# Patient Record
Sex: FEMALE | Race: WHITE | NOT HISPANIC OR LATINO | Employment: OTHER | URBAN - METROPOLITAN AREA
[De-identification: names, ages, dates, MRNs, and addresses within clinical notes are randomized per-mention and may not be internally consistent; named-entity substitution may affect disease eponyms.]

---

## 2020-02-08 NOTE — NURSING NOTE
ACC REVIEW REPORT: [facility]        PATIENT NAME: Sheila Gipson    PATIENT ID: [id number]    BED:  S474 / 4GH    BED TYPE:  TELEMETRY    BED GIVEN TO:   SANGEETA SAMUEL RN    TIME BED GIVEN:  18:42    YOB: 1950    AGE:  70    GENDER:  FEMALE    PREVIOUS ADMIT TO [facility]:  NO    PREVIOUS ADMISSION DATE:  N/A    PATIENT CLASS:  OUTPATIENT    TODAY'S DATE: 2/8/2020    TRANSFER DATE:  02/08/2020    ETA:   22:30 - 23:00    TRANSFERRING FACILITY:   [facility]    TRANSFERRING FACILITY PHONE # :  [phone], this is the number for the ED, ask them to transfer you to the patient's nurses station.  The main phone number is not working on 02/08/2020.     TRANSFERRING MD:  DR SORAYA CANCINO    DATE/TIME REQUEST RECEIVED:  02/08/2020 @ 17:18    [facility] RN:  FAVIO ESPINOZA RN    REPORT FROM:  SANGEETA SAMUEL RN    TIME REPORT TAKEN:  18:42    DIAGNOSIS:  RESPIRATORY FAILURE, COPD, PUNEMONIA    REASON FOR TRANSFER TO [facility]:   The patient has a daughter that lives in [place] and has requested to transfer                                                                   to be closer to her family.     TRANSPORTATION:  [employer], company has to be able to transport patient on BI PAP.    CLINICAL REASON FOR TRANSFER TO [facility]:  The patient was admitted to the [facility] on 02/05 with Respiratory Failure and Flu A.  She has improved but is not ready for discharge to home due to her respiratory status.  Her daughter lives in [place] and she has requested to transfer to [facility] in order to be closer to family.   On transfer she is on a continuous BI PAP 15/8 35% FIO2.  She has been off BI PAP only a short intervals in order to have sips of liquids.  When off her BI PAP she is on a 100% NON RE-BREATHER.  She has a stage II decutibus on the bridge of her nose, mepilex is in place on her nose to protect the skin from further breakdown.  On BI PAP she has maintained her O2 Sats at 97-99%.      CLINICAL  INFORMATION    HEIGHT:  5' 6''    WEIGHT:  123 lbs    ALLERGIES:  NKDA    COLES:  NONE    INFECTIOUS DISEASE:  NONE    ISOLATION:  NONE    1ST VITAL SIGNS:   TIME:  16:00  TEMP:  97.4  PULSE: 59   B/P: 135/68    RESP: 17, O2 Sat 100% on BI PAP,  Settings are 15/8  35%.    LAB INFORMATION:  02/08/2020   WBC 11.2,  H/H  14.5 / 44.4,  ,  ,  K+ 3.9, Chloride 107, CO2  29,                                                              BUN 37,  CREATININE 0.83, GLUCOSE 103,  BNP 1408.     MEDS/IV FLUIDS:  IV ACCESS PER A # 22 ga in the LEFT F/A.  Patient remains on IV Solumedrol and every                                   48 hours IV Levaquin.        CARDIAC SYSTEM:    CHEST PAIN:   NONE    RHYTHM:  SINUS RHYTHM    Is patient taking or has patient been given any drugs that could increase bleeding?    NO  (Plavix, Brilinta, Effient, Eliquis, Xarelto, Warfarin, Integrilin, Angiomax)    CARDIAC NOTES:   Pt has hx of HTN, TOBACCO ABUSE, COPD, and EMPHYSEMA.       RESPIRATORY SYSTEM:    LUNG SOUNDS:    DIMINISHED:  YES    ABG DATE:  02/08/2020        ABG TIME:  11:50    ABG RESULTS:    PH:  7.450  PO2:  147  PCO2:  42  HCO3:  29.2   O2 SAT:  99%  BASE EXCESS:  +5,   ON BI PAP    OXYGEN:  YES    O2 SAT:  99%    ADMINISTRATION ROUTE:  BI PAP   Settings: 15/8  FIO2 35%   The patient CANNOT tolerate any time off her BI PAP.                                                       In order for her to take a drink of liquid the staff have placed her on 100% NON                                                                      RE-BREATHER.  Her head of the bed is always elevated.       RADIOLOGY RESULTS:   A COPY OF HER CXR WILL BE SENT WITH HER.     RESPIRATORY STATUS:  THE PATIENT'S BREATH SOUNDS ARE TIGHT TO AUSCULTATION.  SHE DOES HAVE A PRODUCTIVE COUGH OF WHITE SPUTUM.  SHE HAS NOT REQUIRED SUCTIONING.       CNS/MUSCULOSKELETAL    ALERT AND ORIENTED:    PERSON:  YES  PLACE:  YES  TIME:  YES    INJURY:    YES  WHERE:    THERE IS A STAGE II PRESSURE ULCER ON THE BRIDGE OF HER NOSE FROM HER BI PAP.  MEPILEX                   HAS BEEN PLACED ON HER NOSE TO PAD THE AREA WHERE THE BI PAP SITS.     KIARA COMA SCALE:    E:  4  M: 6   V:  5    EXTREMITY WEAKNESS:    LEFT ARM:  NO, her IV is a 22 gauge placed in her Left FA.  RIGHT ARM:  NO  LEFT LEG:  NO  RIGHT LEG:  NO    CNS/MUSCULOSKELETAL NOTES:  The patient is alert and oriented. Her daughters are present at bedside.                                                                     Mrs Harrington is up with assist of one.  Mrs Harrington is able to converse and has been                                                                   cooperative with her care.       GI//GY      ABDOMINAL PAIN:  NO    VOMITING:  NO    DIARRHEA:  NO    NAUSEA:   NO    BOWEL SOUNDS:  HYPOACTIVE    OCCULT STOOL:  NONE    VAGINAL BLEEDING:  NO     HEMATURIA:   NO    GI//GY NOTES:   THE PATIENT IS ON A DIABETIC DIET, (SHE HAS BEEN ON SOLUMEDROL IV WITH ACCUCHECKS AC AND HS).  TODAY AT 16:00 HER FINGER STICK BLOOD SUGAR , she received 4 units  OF  HUMALOG INSULIN.     INTAKE:  The patient has taken no solid food, only sips of apple juice and Boost.      MEDICATIONS:  MRS. HARRINGTON TAKES HER PILLS WHOLE.      PAST MEDICAL HISTORY:  TOBACCO ABUSE, former smoker, COPD, HTN, PERIPHERAL NEUROPATHY,                                                    ARTHRITIS, and EMPHYSEMA.     ADDITIONAL NOTES:   DAUGHTER:  GERARD GARY,   [phone]                                              SISTER:   NIKKIE CAMPA,   [phone]          Gris Kaba, RN  2/8/2020  6:40 PM

## 2020-02-09 ENCOUNTER — HOSPITAL ENCOUNTER (INPATIENT)
Facility: HOSPITAL | Age: 70
LOS: 8 days | Discharge: NURSING, REHAB OR LONG-TERM CARE | End: 2020-02-17
Attending: INTERNAL MEDICINE | Admitting: FAMILY MEDICINE

## 2020-02-09 ENCOUNTER — APPOINTMENT (OUTPATIENT)
Dept: GENERAL RADIOLOGY | Facility: HOSPITAL | Age: 70
End: 2020-02-09

## 2020-02-09 DIAGNOSIS — Z74.09 IMPAIRED MOBILITY AND ADLS: Primary | ICD-10-CM

## 2020-02-09 DIAGNOSIS — Z78.9 IMPAIRED MOBILITY AND ADLS: Primary | ICD-10-CM

## 2020-02-09 DIAGNOSIS — J96.01 ACUTE RESPIRATORY FAILURE WITH HYPOXIA (HCC): ICD-10-CM

## 2020-02-09 PROBLEM — I10 ESSENTIAL HYPERTENSION: Status: ACTIVE | Noted: 2020-02-09

## 2020-02-09 PROBLEM — R63.4 UNINTENTIONAL WEIGHT LOSS: Status: ACTIVE | Noted: 2020-02-09

## 2020-02-09 PROBLEM — Z72.0 TOBACCO USE: Status: ACTIVE | Noted: 2020-02-09

## 2020-02-09 PROBLEM — I16.0 HYPERTENSIVE URGENCY: Status: ACTIVE | Noted: 2020-02-09

## 2020-02-09 PROBLEM — R53.81 DEBILITY: Status: ACTIVE | Noted: 2020-02-09

## 2020-02-09 PROBLEM — J11.1 INFLUENZA: Status: ACTIVE | Noted: 2020-02-09

## 2020-02-09 PROBLEM — G25.0 BENIGN ESSENTIAL TREMOR: Status: ACTIVE | Noted: 2020-02-09

## 2020-02-09 PROBLEM — J44.1 COPD WITH ACUTE EXACERBATION (HCC): Status: ACTIVE | Noted: 2020-02-09

## 2020-02-09 LAB
ALBUMIN SERPL-MCNC: 4.1 G/DL (ref 3.5–5.2)
ALBUMIN/GLOB SERPL: 1.3 G/DL
ALP SERPL-CCNC: 33 U/L (ref 39–117)
ALT SERPL W P-5'-P-CCNC: 29 U/L (ref 1–33)
ANION GAP SERPL CALCULATED.3IONS-SCNC: 12 MMOL/L (ref 5–15)
ARTERIAL PATENCY WRIST A: ABNORMAL
AST SERPL-CCNC: 29 U/L (ref 1–32)
ATMOSPHERIC PRESS: ABNORMAL MM[HG]
BASE EXCESS BLDA CALC-SCNC: 7.5 MMOL/L (ref 0–2)
BDY SITE: ABNORMAL
BILIRUB SERPL-MCNC: 0.7 MG/DL (ref 0.2–1.2)
BODY TEMPERATURE: 37 C
BUN BLD-MCNC: 44 MG/DL (ref 8–23)
BUN/CREAT SERPL: 51.2 (ref 7–25)
CALCIUM SPEC-SCNC: 9.1 MG/DL (ref 8.6–10.5)
CHLORIDE SERPL-SCNC: 100 MMOL/L (ref 98–107)
CO2 BLDA-SCNC: 34.5 MMOL/L (ref 22–33)
CO2 SERPL-SCNC: 32 MMOL/L (ref 22–29)
COHGB MFR BLD: 0.3 % (ref 0–2)
CREAT BLD-MCNC: 0.86 MG/DL (ref 0.57–1)
D-LACTATE SERPL-SCNC: 1.3 MMOL/L (ref 0.5–2)
DEPRECATED RDW RBC AUTO: 46 FL (ref 37–54)
EPAP: 6
ERYTHROCYTE [DISTWIDTH] IN BLOOD BY AUTOMATED COUNT: 12.8 % (ref 12.3–15.4)
GFR SERPL CREATININE-BSD FRML MDRD: 65 ML/MIN/1.73
GLOBULIN UR ELPH-MCNC: 3.2 GM/DL
GLUCOSE BLD-MCNC: 116 MG/DL (ref 65–99)
HBA1C MFR BLD: 5.9 % (ref 4.8–5.6)
HCO3 BLDA-SCNC: 33.1 MMOL/L (ref 20–26)
HCT VFR BLD AUTO: 47.9 % (ref 34–46.6)
HCT VFR BLD CALC: 48.2 %
HGB BLD-MCNC: 15.5 G/DL (ref 12–15.9)
HGB BLDA-MCNC: 15.7 G/DL (ref 14–18)
HOROWITZ INDEX BLD+IHG-RTO: 40 %
INR PPP: 1.06 (ref 0.85–1.16)
IPAP: 16
MAGNESIUM SERPL-MCNC: 2.7 MG/DL (ref 1.6–2.4)
MCH RBC QN AUTO: 31.4 PG (ref 26.6–33)
MCHC RBC AUTO-ENTMCNC: 32.4 G/DL (ref 31.5–35.7)
MCV RBC AUTO: 97.2 FL (ref 79–97)
METHGB BLD QL: 0.8 % (ref 0–1.5)
MODALITY: ABNORMAL
NOTE: ABNORMAL
NT-PROBNP SERPL-MCNC: 1363 PG/ML (ref 5–900)
OXYHGB MFR BLDV: 97.1 % (ref 94–99)
PCO2 BLDA: 47.9 MM HG (ref 35–45)
PCO2 TEMP ADJ BLD: 47.9 MM HG (ref 35–45)
PH BLDA: 7.45 PH UNITS (ref 7.35–7.45)
PH, TEMP CORRECTED: 7.45 PH UNITS
PLATELET # BLD AUTO: 183 10*3/MM3 (ref 140–450)
PMV BLD AUTO: 10.4 FL (ref 6–12)
PO2 BLDA: 108 MM HG (ref 83–108)
PO2 TEMP ADJ BLD: 108 MM HG (ref 83–108)
POTASSIUM BLD-SCNC: 4.3 MMOL/L (ref 3.5–5.2)
PROT SERPL-MCNC: 7.3 G/DL (ref 6–8.5)
PROTHROMBIN TIME: 13.3 SECONDS (ref 11.2–14.3)
RBC # BLD AUTO: 4.93 10*6/MM3 (ref 3.77–5.28)
SET MECH RESP RATE: 10
SODIUM BLD-SCNC: 144 MMOL/L (ref 136–145)
TSH SERPL DL<=0.05 MIU/L-ACNC: 0.79 UIU/ML (ref 0.27–4.2)
VENTILATOR MODE: ABNORMAL
WBC NRBC COR # BLD: 12.41 10*3/MM3 (ref 3.4–10.8)

## 2020-02-09 PROCEDURE — 71045 X-RAY EXAM CHEST 1 VIEW: CPT

## 2020-02-09 PROCEDURE — 25010000002 HEPARIN (PORCINE) PER 1000 UNITS: Performed by: INTERNAL MEDICINE

## 2020-02-09 PROCEDURE — 83036 HEMOGLOBIN GLYCOSYLATED A1C: CPT | Performed by: INTERNAL MEDICINE

## 2020-02-09 PROCEDURE — 85027 COMPLETE CBC AUTOMATED: CPT | Performed by: INTERNAL MEDICINE

## 2020-02-09 PROCEDURE — 36600 WITHDRAWAL OF ARTERIAL BLOOD: CPT

## 2020-02-09 PROCEDURE — 84443 ASSAY THYROID STIM HORMONE: CPT | Performed by: INTERNAL MEDICINE

## 2020-02-09 PROCEDURE — 94660 CPAP INITIATION&MGMT: CPT

## 2020-02-09 PROCEDURE — 63710000001 PREDNISONE PER 1 MG: Performed by: INTERNAL MEDICINE

## 2020-02-09 PROCEDURE — 94799 UNLISTED PULMONARY SVC/PX: CPT

## 2020-02-09 PROCEDURE — 99223 1ST HOSP IP/OBS HIGH 75: CPT | Performed by: INTERNAL MEDICINE

## 2020-02-09 PROCEDURE — 83605 ASSAY OF LACTIC ACID: CPT | Performed by: INTERNAL MEDICINE

## 2020-02-09 PROCEDURE — 82805 BLOOD GASES W/O2 SATURATION: CPT

## 2020-02-09 PROCEDURE — 80053 COMPREHEN METABOLIC PANEL: CPT | Performed by: INTERNAL MEDICINE

## 2020-02-09 PROCEDURE — 85610 PROTHROMBIN TIME: CPT | Performed by: INTERNAL MEDICINE

## 2020-02-09 PROCEDURE — 25010000002 CEFTRIAXONE PER 250 MG: Performed by: INTERNAL MEDICINE

## 2020-02-09 PROCEDURE — 87040 BLOOD CULTURE FOR BACTERIA: CPT | Performed by: INTERNAL MEDICINE

## 2020-02-09 PROCEDURE — 94640 AIRWAY INHALATION TREATMENT: CPT

## 2020-02-09 PROCEDURE — 83735 ASSAY OF MAGNESIUM: CPT | Performed by: INTERNAL MEDICINE

## 2020-02-09 PROCEDURE — 83880 ASSAY OF NATRIURETIC PEPTIDE: CPT | Performed by: INTERNAL MEDICINE

## 2020-02-09 RX ORDER — ACETAMINOPHEN 325 MG/1
650 TABLET ORAL EVERY 4 HOURS PRN
Status: DISCONTINUED | OUTPATIENT
Start: 2020-02-09 | End: 2020-02-17 | Stop reason: HOSPADM

## 2020-02-09 RX ORDER — BUDESONIDE 0.5 MG/2ML
0.5 INHALANT ORAL
Status: DISCONTINUED | OUTPATIENT
Start: 2020-02-09 | End: 2020-02-16

## 2020-02-09 RX ORDER — PROPRANOLOL HYDROCHLORIDE 10 MG/1
10 TABLET ORAL 2 TIMES DAILY
Status: DISCONTINUED | OUTPATIENT
Start: 2020-02-09 | End: 2020-02-17 | Stop reason: HOSPADM

## 2020-02-09 RX ORDER — ARFORMOTEROL TARTRATE 15 UG/2ML
15 SOLUTION RESPIRATORY (INHALATION)
Status: DISCONTINUED | OUTPATIENT
Start: 2020-02-09 | End: 2020-02-16

## 2020-02-09 RX ORDER — IPRATROPIUM BROMIDE AND ALBUTEROL SULFATE 2.5; .5 MG/3ML; MG/3ML
3 SOLUTION RESPIRATORY (INHALATION)
Status: DISCONTINUED | OUTPATIENT
Start: 2020-02-09 | End: 2020-02-16

## 2020-02-09 RX ORDER — DOCUSATE SODIUM 100 MG/1
100 CAPSULE, LIQUID FILLED ORAL 2 TIMES DAILY PRN
Status: DISCONTINUED | OUTPATIENT
Start: 2020-02-09 | End: 2020-02-17 | Stop reason: HOSPADM

## 2020-02-09 RX ORDER — AZITHROMYCIN 250 MG/1
250 TABLET, FILM COATED ORAL DAILY
Status: COMPLETED | OUTPATIENT
Start: 2020-02-10 | End: 2020-02-13

## 2020-02-09 RX ORDER — NICOTINE 21 MG/24HR
1 PATCH, TRANSDERMAL 24 HOURS TRANSDERMAL
Status: DISCONTINUED | OUTPATIENT
Start: 2020-02-09 | End: 2020-02-09

## 2020-02-09 RX ORDER — METHYLPREDNISOLONE SODIUM SUCCINATE 40 MG/ML
20 INJECTION, POWDER, LYOPHILIZED, FOR SOLUTION INTRAMUSCULAR; INTRAVENOUS EVERY 8 HOURS
Status: DISCONTINUED | OUTPATIENT
Start: 2020-02-09 | End: 2020-02-09

## 2020-02-09 RX ORDER — HEPARIN SODIUM 5000 [USP'U]/ML
5000 INJECTION, SOLUTION INTRAVENOUS; SUBCUTANEOUS EVERY 8 HOURS SCHEDULED
Status: DISCONTINUED | OUTPATIENT
Start: 2020-02-09 | End: 2020-02-17 | Stop reason: HOSPADM

## 2020-02-09 RX ORDER — FAMOTIDINE 20 MG/1
20 TABLET, FILM COATED ORAL 2 TIMES DAILY PRN
Status: DISCONTINUED | OUTPATIENT
Start: 2020-02-09 | End: 2020-02-17 | Stop reason: HOSPADM

## 2020-02-09 RX ORDER — CHOLECALCIFEROL (VITAMIN D3) 125 MCG
5 CAPSULE ORAL NIGHTLY PRN
Status: DISCONTINUED | OUTPATIENT
Start: 2020-02-09 | End: 2020-02-17 | Stop reason: HOSPADM

## 2020-02-09 RX ORDER — ACETAMINOPHEN 650 MG/1
650 SUPPOSITORY RECTAL EVERY 4 HOURS PRN
Status: DISCONTINUED | OUTPATIENT
Start: 2020-02-09 | End: 2020-02-17 | Stop reason: HOSPADM

## 2020-02-09 RX ORDER — AZITHROMYCIN 250 MG/1
500 TABLET, FILM COATED ORAL DAILY
Status: COMPLETED | OUTPATIENT
Start: 2020-02-09 | End: 2020-02-09

## 2020-02-09 RX ORDER — PREDNISONE 20 MG/1
40 TABLET ORAL
Status: DISCONTINUED | OUTPATIENT
Start: 2020-02-09 | End: 2020-02-10

## 2020-02-09 RX ORDER — SODIUM CHLORIDE 0.9 % (FLUSH) 0.9 %
10 SYRINGE (ML) INJECTION AS NEEDED
Status: DISCONTINUED | OUTPATIENT
Start: 2020-02-09 | End: 2020-02-17 | Stop reason: HOSPADM

## 2020-02-09 RX ORDER — ONDANSETRON 2 MG/ML
4 INJECTION INTRAMUSCULAR; INTRAVENOUS EVERY 6 HOURS PRN
Status: DISCONTINUED | OUTPATIENT
Start: 2020-02-09 | End: 2020-02-17 | Stop reason: HOSPADM

## 2020-02-09 RX ORDER — NICOTINE 21 MG/24HR
1 PATCH, TRANSDERMAL 24 HOURS TRANSDERMAL
Status: DISCONTINUED | OUTPATIENT
Start: 2020-02-09 | End: 2020-02-17 | Stop reason: HOSPADM

## 2020-02-09 RX ORDER — ACETAMINOPHEN 160 MG/5ML
650 SOLUTION ORAL EVERY 4 HOURS PRN
Status: DISCONTINUED | OUTPATIENT
Start: 2020-02-09 | End: 2020-02-17 | Stop reason: HOSPADM

## 2020-02-09 RX ORDER — LABETALOL HYDROCHLORIDE 5 MG/ML
10 INJECTION, SOLUTION INTRAVENOUS
Status: DISCONTINUED | OUTPATIENT
Start: 2020-02-09 | End: 2020-02-17 | Stop reason: HOSPADM

## 2020-02-09 RX ORDER — ONDANSETRON 4 MG/1
4 TABLET, FILM COATED ORAL EVERY 6 HOURS PRN
Status: DISCONTINUED | OUTPATIENT
Start: 2020-02-09 | End: 2020-02-17 | Stop reason: HOSPADM

## 2020-02-09 RX ORDER — SODIUM CHLORIDE 0.9 % (FLUSH) 0.9 %
10 SYRINGE (ML) INJECTION EVERY 12 HOURS SCHEDULED
Status: DISCONTINUED | OUTPATIENT
Start: 2020-02-09 | End: 2020-02-17 | Stop reason: HOSPADM

## 2020-02-09 RX ADMIN — CEFTRIAXONE 1 G: 1 INJECTION, POWDER, FOR SOLUTION INTRAMUSCULAR; INTRAVENOUS at 18:53

## 2020-02-09 RX ADMIN — AZITHROMYCIN MONOHYDRATE 500 MG: 250 TABLET ORAL at 18:52

## 2020-02-09 RX ADMIN — NICOTINE 1 PATCH: 14 PATCH, EXTENDED RELEASE TRANSDERMAL at 18:54

## 2020-02-09 RX ADMIN — BUDESONIDE 0.5 MG: 0.5 INHALANT RESPIRATORY (INHALATION) at 23:59

## 2020-02-09 RX ADMIN — HEPARIN SODIUM 5000 UNITS: 5000 INJECTION, SOLUTION INTRAVENOUS; SUBCUTANEOUS at 20:39

## 2020-02-09 RX ADMIN — HEPARIN SODIUM 5000 UNITS: 5000 INJECTION, SOLUTION INTRAVENOUS; SUBCUTANEOUS at 18:53

## 2020-02-09 RX ADMIN — SODIUM CHLORIDE, PRESERVATIVE FREE 10 ML: 5 INJECTION INTRAVENOUS at 20:41

## 2020-02-09 RX ADMIN — PROPRANOLOL HYDROCHLORIDE 10 MG: 10 TABLET ORAL at 20:40

## 2020-02-09 RX ADMIN — PREDNISONE 40 MG: 20 TABLET ORAL at 19:52

## 2020-02-09 RX ADMIN — IPRATROPIUM BROMIDE AND ALBUTEROL SULFATE 3 ML: 2.5; .5 SOLUTION RESPIRATORY (INHALATION) at 23:59

## 2020-02-09 RX ADMIN — ARFORMOTEROL TARTRATE 15 MCG: 15 SOLUTION RESPIRATORY (INHALATION) at 23:59

## 2020-02-09 RX ADMIN — LABETALOL 20 MG/4 ML (5 MG/ML) INTRAVENOUS SYRINGE 10 MG: at 15:37

## 2020-02-09 RX ADMIN — IPRATROPIUM BROMIDE AND ALBUTEROL SULFATE 3 ML: 2.5; .5 SOLUTION RESPIRATORY (INHALATION) at 15:42

## 2020-02-09 NOTE — H&P
[facility] Medicine Services  HISTORY AND PHYSICAL    Patient Name: Sheila Gipson  : 1950  MRN: [id number]  Primary Care Physician: Provider, No Known  Date of admission: 2020      Subjective   Subjective     Chief Complaint:  Transfer for flu, respiratory failure    HPI:  Sheila Gipson is a 69 y.o. female with past medical history of chronic respiratory failure, COPD, essential tremors, and hypertension who initially developed severe progressive shortness of breath on  associated with productive yellow sputum which was worsened with exertion and improved with rest and breathing treatments.  She had associated fevers and chills and flulike symptoms.  She presented to [facility] on  and was diagnosed with influenza and placed on BiPAP.  She reportedly has been receiving Tamiflu and BiPAP therapy along with steroids and nebulizers since that time.  Her daughter who lives in [place] requested she be transferred to [facility] [place] by [facility] for a higher level of care and to be closer to her family.  She reportedly has transitioned off BiPAP on a 100% nonrebreather just long enough to eat an occasional bite.  She except for a little juice she has not had any significant calorie intake for several days now.  She is developed a decubitus ulcer on the bridge of her nose and a Mepilex is currently on the bridge of her nose to prevent further skin breakdown.  She arrived at our hospital on a BiPAP with 35% oxygen.  She says her shortness of breath has improved.  She denies any confusion.  Her daughter is at the bedside.  She says her fevers and chills have improved.  She notes severe generalized weakness.  She also complains of a 15 pound weight loss over the last 2 months due to poor appetite.  She is a relatively poor historian.  Her daughter has assisted with some history.    Review of Systems   Constitutional: Positive for chills, fatigue and fever.   HENT: Negative for sinus pressure and  sinus pain.    Eyes: Negative.    Respiratory: Positive for cough, shortness of breath and wheezing.    Cardiovascular: Negative for chest pain and palpitations.   Gastrointestinal: Negative for diarrhea, nausea and vomiting.   Endocrine: Negative.    Genitourinary: Negative for dysuria and hematuria.   Musculoskeletal: Negative for neck pain and neck stiffness.   Skin: Negative for rash and wound.   Allergic/Immunologic: Negative.    Neurological: Negative for light-headedness and headaches.   Hematological: Negative for adenopathy. Does not bruise/bleed easily.   Psychiatric/Behavioral: Negative for confusion. The patient is not nervous/anxious.           Personal History     Past medical history reported: Tobacco use, COPD/emphysema, essential hypertension, and essential tremor    Denies previous surgeries    Family History:  pertinent FHx was reviewed and unremarkable.     Social History:    Patient currently smokes cigarettes but wants to quit.  Denies any alcohol use    Medications:  Available home medication information reviewed.  Reports home medicine currently propranolol 10 mg twice daily and albuterol inhaler    No known allergies    Objective   Objective     Vital Signs:   Heart Rate:  [60-63] 63  BP: (190)/(109) 190/109       Blood pressure 190/109  95% oxygen on BiPAP  Respirations 22    Physical Exam   Constitutional: Awake, alert  Eyes: PERRLA, sclerae anicteric, no conjunctival injection  HENT: nose bridge decub, NCAT, mucous membranes moist  Neck: Supple, no thyromegaly, no lymphadenopathy, trachea midline  Respiratory: Diffuse wheezing and rhonchi bilaterally, occasional productive cough, labored breathing with some accessory muscle use, tachypnea, currently on BiPAP at 35%o2  Cardiovascular: RRR, palpable radial pulses bilaterally  Gastrointestinal: Positive bowel sounds, soft, nontender, nondistended  Musculoskeletal: No bilateral ankle edema, no clubbing or cyanosis to extremities  Psychiatric:  Appropriate affect, cooperative  Neurologic: No slurred speech or facial droop,  oriented  Skin: No rashes or jaundice      Results Reviewed:  Chest x-ray ordered and pending    Only outside hospital records sent was a medication list of hospitals medications being given at [facility].  They include Solu-Medrol, Tamiflu, Levaquin, duo nebs, nicotine patch, propranolol    No other progress notes, labs, or radiology results available              Invalid input(s):  ALKPHOS  CrCl cannot be calculated (No successful lab value found.).  Brief Urine Lab Results     None        Imaging Results (Last 24 Hours)     Procedure Component Value Units Date/Time    XR Chest 1 View [[id number]] Resulted:  02/09/20 1538     Updated:  02/09/20 1538             Assessment/Plan   Assessment & Plan     Active Hospital Problems    Diagnosis POA   • **Acute respiratory failure with hypoxia (CMS/HCC) [J96.01] Yes   • Tobacco use [Z72.0] Yes   • COPD with acute exacerbation (CMS/HCC) [J44.1] Yes   • Influenza [J11.1] Yes   • Benign essential tremor [G25.0] Yes   • Unintentional weight loss [R63.4] Yes   • Essential hypertension [I10] Yes   • Hypertensive urgency [I16.0] Yes     69-year-old female admitted at [facility] on 2/5 with influenza, acute on chronic hypoxic respiratory failure requiring BiPAP and was transferred to [facility] for further medical management.    Acute on chronic hypoxic respiratory failure:  Due to influenza and COPD exacerbation.  Plan to try and wean off BiPAP today.  Check ABG.  Treat underlying issues.  Check chest x-ray.  No imaging studies from outside facility currently available.    Acute influenza:  Awaiting outside records to confirm diagnosis.  Continue Tamiflu.  Supportive care.  Possible concomitant bacterial bronchitis or pneumonia.  Also plan for azithromycin and ceftriaxone.  Stop Levaquin due to high side effect profile.    COPD exacerbation   Duo nebs, nebulized Pulmicort, nebulized Brovana, prednisone.   Wean oxygen as tolerated.    Unintentional weight loss: Concern for malnutrition.  Consult dietitian to assess.  Supplements.    Accelerated hypertension with underlying essential hypertension:  Continue home propranolol.  Labetalol as needed for blood pressure greater than 180.  Monitor and adjust as needed.    Essential tremor: Continue propranolol.  Monitor.    Tobacco use: Cessation counseled.  Nicotine patch.    Outside records are available reviewed.  Currently only and list of medications.  Request has been placed for full outside records.  Awaiting laboratory studies.  Follow-up on chest x-ray.  Follow-up on ABG.  Case discussed with respiratory therapy who is planning to try and wean her off of BiPAP.  PT OT ordered for debility.    DVT prophylaxis:  KENIA    CODE STATUS:    Code Status and Medical Interventions:   Ordered at: 02/09/20 1526     Code Status:    No CPR     Medical Interventions (Level of Support Prior to Arrest):    Full     Comments:    ok with intubation no CPR   CODE STATUS discussed with patient and daughter at the bedside.  Patient says she would not want CPR however if she needed 1 to 2 days on a ventilator she says she probably would not want this.  She will continue to think and discuss with her family regarding her ventilator wishes.  She reaffirmed wishes for no CPR with myself and the nurse present.    Admission Status:  I believe this patient meets INPATIENT status due to respiratory failure and influenza.  I feel patient’s risk for adverse outcomes and need for care warrant INPATIENT evaluation and I predict the patient’s care encounter to likely last beyond 2 midnights.      Electronically signed by Chato Felder MD, 02/09/20, 3:28 PM.

## 2020-02-10 ENCOUNTER — APPOINTMENT (OUTPATIENT)
Dept: GENERAL RADIOLOGY | Facility: HOSPITAL | Age: 70
End: 2020-02-10

## 2020-02-10 ENCOUNTER — APPOINTMENT (OUTPATIENT)
Dept: CT IMAGING | Facility: HOSPITAL | Age: 70
End: 2020-02-10

## 2020-02-10 LAB
ANION GAP SERPL CALCULATED.3IONS-SCNC: 12 MMOL/L (ref 5–15)
BUN BLD-MCNC: 42 MG/DL (ref 8–23)
BUN/CREAT SERPL: 56.8 (ref 7–25)
CALCIUM SPEC-SCNC: 8.7 MG/DL (ref 8.6–10.5)
CHLORIDE SERPL-SCNC: 98 MMOL/L (ref 98–107)
CO2 SERPL-SCNC: 30 MMOL/L (ref 22–29)
CREAT BLD-MCNC: 0.74 MG/DL (ref 0.57–1)
DEPRECATED RDW RBC AUTO: 46.2 FL (ref 37–54)
ERYTHROCYTE [DISTWIDTH] IN BLOOD BY AUTOMATED COUNT: 12.8 % (ref 12.3–15.4)
GFR SERPL CREATININE-BSD FRML MDRD: 78 ML/MIN/1.73
GLUCOSE BLD-MCNC: 110 MG/DL (ref 65–99)
HCT VFR BLD AUTO: 47.7 % (ref 34–46.6)
HGB BLD-MCNC: 15.4 G/DL (ref 12–15.9)
MAGNESIUM SERPL-MCNC: 2.7 MG/DL (ref 1.6–2.4)
MCH RBC QN AUTO: 31.6 PG (ref 26.6–33)
MCHC RBC AUTO-ENTMCNC: 32.3 G/DL (ref 31.5–35.7)
MCV RBC AUTO: 97.9 FL (ref 79–97)
PLATELET # BLD AUTO: 169 10*3/MM3 (ref 140–450)
PMV BLD AUTO: 11 FL (ref 6–12)
POTASSIUM BLD-SCNC: 4.1 MMOL/L (ref 3.5–5.2)
RBC # BLD AUTO: 4.87 10*6/MM3 (ref 3.77–5.28)
SODIUM BLD-SCNC: 140 MMOL/L (ref 136–145)
WBC NRBC COR # BLD: 13.71 10*3/MM3 (ref 3.4–10.8)

## 2020-02-10 PROCEDURE — 83735 ASSAY OF MAGNESIUM: CPT | Performed by: INTERNAL MEDICINE

## 2020-02-10 PROCEDURE — 25010000002 HEPARIN (PORCINE) PER 1000 UNITS: Performed by: INTERNAL MEDICINE

## 2020-02-10 PROCEDURE — 94799 UNLISTED PULMONARY SVC/PX: CPT

## 2020-02-10 PROCEDURE — 25010000002 METHYLPREDNISOLONE PER 125 MG: Performed by: PHYSICIAN ASSISTANT

## 2020-02-10 PROCEDURE — 99233 SBSQ HOSP IP/OBS HIGH 50: CPT | Performed by: HOSPITALIST

## 2020-02-10 PROCEDURE — 0 IOPAMIDOL PER 1 ML: Performed by: HOSPITALIST

## 2020-02-10 PROCEDURE — 80048 BASIC METABOLIC PNL TOTAL CA: CPT | Performed by: INTERNAL MEDICINE

## 2020-02-10 PROCEDURE — 25010000002 CEFTRIAXONE PER 250 MG: Performed by: INTERNAL MEDICINE

## 2020-02-10 PROCEDURE — 85027 COMPLETE CBC AUTOMATED: CPT | Performed by: INTERNAL MEDICINE

## 2020-02-10 PROCEDURE — 71045 X-RAY EXAM CHEST 1 VIEW: CPT

## 2020-02-10 PROCEDURE — 71275 CT ANGIOGRAPHY CHEST: CPT

## 2020-02-10 RX ORDER — METHYLPREDNISOLONE SODIUM SUCCINATE 125 MG/2ML
80 INJECTION, POWDER, LYOPHILIZED, FOR SOLUTION INTRAMUSCULAR; INTRAVENOUS EVERY 12 HOURS SCHEDULED
Status: DISCONTINUED | OUTPATIENT
Start: 2020-02-10 | End: 2020-02-12

## 2020-02-10 RX ORDER — IPRATROPIUM BROMIDE AND ALBUTEROL SULFATE 2.5; .5 MG/3ML; MG/3ML
6 SOLUTION RESPIRATORY (INHALATION) ONCE
Status: COMPLETED | OUTPATIENT
Start: 2020-02-10 | End: 2020-02-10

## 2020-02-10 RX ORDER — SODIUM CHLORIDE 450 MG/100ML
125 INJECTION, SOLUTION INTRAVENOUS CONTINUOUS
Status: DISCONTINUED | OUTPATIENT
Start: 2020-02-10 | End: 2020-02-14

## 2020-02-10 RX ORDER — OSELTAMIVIR PHOSPHATE 75 MG/1
75 CAPSULE ORAL EVERY 12 HOURS SCHEDULED
Status: DISCONTINUED | OUTPATIENT
Start: 2020-02-10 | End: 2020-02-10

## 2020-02-10 RX ORDER — OSELTAMIVIR PHOSPHATE 30 MG/1
30 CAPSULE ORAL EVERY 12 HOURS SCHEDULED
Status: COMPLETED | OUTPATIENT
Start: 2020-02-10 | End: 2020-02-12

## 2020-02-10 RX ORDER — ALPRAZOLAM 0.25 MG/1
0.25 TABLET ORAL ONCE AS NEEDED
Status: COMPLETED | OUTPATIENT
Start: 2020-02-10 | End: 2020-02-11

## 2020-02-10 RX ADMIN — IPRATROPIUM BROMIDE AND ALBUTEROL SULFATE 6 ML: 2.5; .5 SOLUTION RESPIRATORY (INHALATION) at 03:51

## 2020-02-10 RX ADMIN — HEPARIN SODIUM 5000 UNITS: 5000 INJECTION, SOLUTION INTRAVENOUS; SUBCUTANEOUS at 20:45

## 2020-02-10 RX ADMIN — OSELTAMIVIR PHOSPHATE 30 MG: 30 CAPSULE ORAL at 20:45

## 2020-02-10 RX ADMIN — OSELTAMIVIR PHOSPHATE 75 MG: 75 CAPSULE ORAL at 09:25

## 2020-02-10 RX ADMIN — PROPRANOLOL HYDROCHLORIDE 10 MG: 10 TABLET ORAL at 09:25

## 2020-02-10 RX ADMIN — IPRATROPIUM BROMIDE AND ALBUTEROL SULFATE 3 ML: 2.5; .5 SOLUTION RESPIRATORY (INHALATION) at 12:15

## 2020-02-10 RX ADMIN — IPRATROPIUM BROMIDE AND ALBUTEROL SULFATE 3 ML: 2.5; .5 SOLUTION RESPIRATORY (INHALATION) at 15:28

## 2020-02-10 RX ADMIN — HEPARIN SODIUM 5000 UNITS: 5000 INJECTION, SOLUTION INTRAVENOUS; SUBCUTANEOUS at 05:36

## 2020-02-10 RX ADMIN — HEPARIN SODIUM 5000 UNITS: 5000 INJECTION, SOLUTION INTRAVENOUS; SUBCUTANEOUS at 14:20

## 2020-02-10 RX ADMIN — SODIUM CHLORIDE 125 ML/HR: 4.5 INJECTION, SOLUTION INTRAVENOUS at 23:25

## 2020-02-10 RX ADMIN — SODIUM CHLORIDE, PRESERVATIVE FREE 10 ML: 5 INJECTION INTRAVENOUS at 20:44

## 2020-02-10 RX ADMIN — BUDESONIDE 0.5 MG: 0.5 INHALANT RESPIRATORY (INHALATION) at 20:25

## 2020-02-10 RX ADMIN — IPRATROPIUM BROMIDE AND ALBUTEROL SULFATE 3 ML: 2.5; .5 SOLUTION RESPIRATORY (INHALATION) at 20:25

## 2020-02-10 RX ADMIN — SODIUM CHLORIDE 125 ML/HR: 4.5 INJECTION, SOLUTION INTRAVENOUS at 13:34

## 2020-02-10 RX ADMIN — SODIUM CHLORIDE, PRESERVATIVE FREE 10 ML: 5 INJECTION INTRAVENOUS at 09:26

## 2020-02-10 RX ADMIN — METHYLPREDNISOLONE SODIUM SUCCINATE 80 MG: 125 INJECTION, POWDER, FOR SOLUTION INTRAMUSCULAR; INTRAVENOUS at 03:59

## 2020-02-10 RX ADMIN — PROPRANOLOL HYDROCHLORIDE 10 MG: 10 TABLET ORAL at 20:45

## 2020-02-10 RX ADMIN — AZITHROMYCIN MONOHYDRATE 250 MG: 250 TABLET ORAL at 09:25

## 2020-02-10 RX ADMIN — BUDESONIDE 0.5 MG: 0.5 INHALANT RESPIRATORY (INHALATION) at 07:26

## 2020-02-10 RX ADMIN — ARFORMOTEROL TARTRATE 15 MCG: 15 SOLUTION RESPIRATORY (INHALATION) at 07:25

## 2020-02-10 RX ADMIN — METHYLPREDNISOLONE SODIUM SUCCINATE 80 MG: 125 INJECTION, POWDER, FOR SOLUTION INTRAMUSCULAR; INTRAVENOUS at 17:38

## 2020-02-10 RX ADMIN — IPRATROPIUM BROMIDE AND ALBUTEROL SULFATE 3 ML: 2.5; .5 SOLUTION RESPIRATORY (INHALATION) at 07:25

## 2020-02-10 RX ADMIN — CEFTRIAXONE 1 G: 1 INJECTION, POWDER, FOR SOLUTION INTRAMUSCULAR; INTRAVENOUS at 15:06

## 2020-02-10 RX ADMIN — NICOTINE 1 PATCH: 14 PATCH, EXTENDED RELEASE TRANSDERMAL at 09:26

## 2020-02-10 RX ADMIN — IOPAMIDOL 60 ML: 755 INJECTION, SOLUTION INTRAVENOUS at 15:45

## 2020-02-10 NOTE — CONSULTS
NN visited pt at BS twice today.  Consult unable to be completed due to other tests/ MD visits today.  NN will complete consult as soon as possible tomorrow.

## 2020-02-10 NOTE — NURSING NOTE
[facility] nurse for pressure injury to nasal bridge from BiPAP - present on admission.  This area on the nose is hard to decern if friction &/or pressure causing this impaired skin integrity .  Patient is requiring extended BiPAP usage.  Currently using Gel Pad under Bipap.  Discussed POC with BRIJESH Denny, would like to ask RT about HiFlow O2 to give relief to the skin on the nasal bridge.  Would like to follow up in a few days to reassess the nasal bridge.  Coccyx is CDI and blanchable, bilateral heels are dry with the right heel at high risk for fissure wound.  Would recommend z guard and xeroform with dry dressing daily.  Will order a speciality bed as well, Pt with low adolph score, impaired mobility, incontinent, advanced age, poor nutrition and at risk for skin breakdown.  Low air loss mattress and regular bed frame ordered from [employer] [phone].

## 2020-02-10 NOTE — PLAN OF CARE
Problem: Patient Care Overview  Goal: Plan of Care Review  2/10/2020 0349 by Elyse Wilde RN  Outcome: Ongoing (interventions implemented as appropriate)  Flowsheets  Taken 2/10/2020 0349  Progress: no change  Taken 2/9/2020 2000  Plan of Care Reviewed With: patient;daughter  Taken 2/10/2020 0348  Outcome Summary: Pt on 4 L O2 NC at this time.  Pt with increased anxiety. Spoke with NP and new orders received.  Will continue to monitor closely.

## 2020-02-10 NOTE — PROGRESS NOTES
[facility] Medicine Services  PROGRESS NOTE    Patient Name: Sheila Gipson  : 1950  MRN: [id number]    Date of Admission: 2020  Primary Care Physician: Tu Lyons MD    Subjective   Subjective     CC:   Respiratory failure      HPI:   Transferred twice with Respiratory Failure.  Was on BiPAP for 3-4 days.  Still working hard to breathe.  She looks fatigued and is abdominal breathing.  She has poor functional capacity.  She says things came on very quickly.  Daughter in room today.  Patient is a poor historian.    Review of Systems    Gen- No fevers, chills  CV- No chest pain, palpitations  Resp- No cough, + dyspnea  GI- No N/V/D, abd pain    Objective   Objective     Vital Signs:   Temp:  [96.6 °F (35.9 °C)-98.3 °F (36.8 °C)] 96.6 °F (35.9 °C)  Heart Rate:  [48-70] 50  Resp:  [14-20] 18  BP: (133-190)/() 152/83        Physical Exam:    Constitutional: respiratory distress on oxygen, awake, alert  HENT: NCAT, dry tongue  Respiratory: poor inspiratory effort, distant breath sounds, no wheeze, some work of breathing  Cardiovascular: bradycardic, s1 and s2  Gastrointestinal: Positive bowel sounds, soft, mild tenderness, abdominal breathing  Musculoskeletal: wasted muscles of the extremities  Psychiatric: Appropriate affect, cooperative  Neurologic: Oriented x 3, strength symmetric in all extremities, Cranial Nerves grossly intact to confrontation, speech clear  Skin: dry, + skin tenting    Results Reviewed:  Results from last 7 days   Lab Units 02/10/20  0358 20  1619 20  1533   WBC 10*3/mm3 13.71* 12.41*  --    HEMOGLOBIN g/dL 15.4 15.5  --    HEMATOCRIT % 47.7* 47.9*  --    PLATELETS 10*3/mm3 169 183  --    INR   --   --  1.06     Results from last 7 days   Lab Units 02/10/20  0358 20  1533   SODIUM mmol/L 140 144   POTASSIUM mmol/L 4.1 4.3   CHLORIDE mmol/L 98 100   CO2 mmol/L 30.0* 32.0*   BUN mg/dL 42* 44*   CREATININE mg/dL 0.74 0.86    GLUCOSE mg/dL 110* 116*   CALCIUM mg/dL 8.7 9.1   ALT (SGPT) U/L  --  29   AST (SGOT) U/L  --  29   PROBNP pg/mL  --  1,363.0*     CrCl cannot be calculated (Unknown ideal weight.).    Microbiology Results Abnormal     Procedure Component Value - Date/Time    Blood Culture - Blood, Arm, Left [[id number]] Collected:  02/09/20 1533    Lab Status:  Preliminary result Specimen:  Blood from Arm, Left Updated:  02/10/20 0431     Blood Culture No growth at less than 24 hours    Blood Culture - Blood, Arm, Right [[id number]] Collected:  02/09/20 1542    Lab Status:  Preliminary result Specimen:  Blood from Arm, Right Updated:  02/10/20 0431     Blood Culture No growth at less than 24 hours          Imaging Results (Last 24 Hours)     Procedure Component Value Units Date/Time    XR Chest 1 View [[id number]] Collected:  02/09/20 1725     Updated:  02/10/20 0925    Narrative:       EXAMINATION: XR CHEST 1 VW-      INDICATION: Fever.      COMPARISON: 12/27/2010.     FINDINGS: Portable chest reveals cardiac and mediastinal silhouettes  within normal limits. Severe emphysematous and bullous changes are seen  in the upper lung fields. The lung fields are otherwise clear. No focal  parenchymal opacification is present.  No pleural effusion or  pneumothorax. The bony structures reveal degenerative changes seen  within the spine. Vascular calcification is seen within the thoracic  aorta.           Impression:       Severe emphysematous and chronic changes seen within the  lung fields with no evidence of acute parenchymal disease.     D:  02/09/2020  E:  02/10/2020       XR Chest 1 View [[id number]] Collected:  02/10/20 0505     Updated:  02/10/20 0507    Narrative:       CR Chest 1 Vw    INDICATION:   69-year-old female with shortness of air and COPD.     COMPARISON:    2/9/2020 at 1556 hours    FINDINGS:  Single portable AP view(s) of the chest.    No visible support lines.    Artifact related to support equipment on the right.  Cardiac silhouette within normal limits. Unremarkable vascularity. Lungs demonstrate sequela of advanced COPD with pruning of the interstitial markings in the lung apices right greater than left with  associated bleb/bulla formation. There is no new dense consolidation or effusion and there is no pneumothorax. There are calcified granulomas.       Impression:         1. Chronic advanced emphysematous changes. No definite superimposed active disease.    Signer Name: Bradly Ross MD   Signed: 2/10/2020 5:05 AM   Workstation Name: [facility]    [facility]               I have reviewed the medications:  Scheduled Meds:  arformoterol 15 mcg Nebulization BID - RT   azithromycin 250 mg Oral Daily   budesonide 0.5 mg Nebulization BID - RT   cefTRIAXone 1 g Intravenous Q24H   heparin (porcine) 5,000 Units Subcutaneous Q8H   ipratropium-albuterol 3 mL Nebulization 4x Daily - RT   methylPREDNISolone sodium succinate 80 mg Intravenous Q12H   nicotine 1 patch Transdermal Q24H   oseltamivir 75 mg Oral Q12H   pharmacy consult - MTM  Does not apply Daily   propranolol 10 mg Oral BID   sodium chloride 10 mL Intravenous Q12H     Continuous Infusions:  Pharmacy Consult    sodium chloride 125 mL/hr     PRN Meds:.•  acetaminophen **OR** acetaminophen **OR** acetaminophen  •  ALPRAZolam  •  docusate sodium  •  famotidine  •  labetalol  •  melatonin  •  ondansetron **OR** ondansetron  •  Pharmacy Consult  •  sodium chloride    Assessment/Plan   Assessment & Plan     Active Hospital Problems    Diagnosis  POA   • **Acute respiratory failure with hypoxia (CMS/HCC) [J96.01]  Yes   • Tobacco use [Z72.0]  Yes   • COPD with acute exacerbation (CMS/HCC) [J44.1]  Yes   • Influenza [J11.1]  Yes   • Benign essential tremor [G25.0]  Yes   • Unintentional weight loss [R63.4]  Yes   • Essential hypertension [I10]  Yes   • Hypertensive urgency [I16.0]  Yes   • Debility [R53.81]  Yes      Resolved Hospital Problems   No  resolved problems to display.        Brief Hospital Course to date:  Sheila Gipson is a 69 y.o. female transferred twice for unresolved respiratory failure.    Acute on Chronic Hypoxic Respiratory Failure  - was on BiPAP for 3-4 days  - transferred from outside ER ([place]) to [facility]  - transferred from [place] here  New Onset A Fib RVR  - she says she never had this before  - cardiac disease in mother and father  - ? Ischemic evaluation  - Cardiology eval  - Echo  Severe COPD  - heavy smoker up to 3 ppd  - has been on 3 L of oxygen for at least a year  Tobacco Use Disorder  - smoker for years  - 21-70 yrs old  - suspected over 100 pack year history  - nicotine patch    STAT CTA  Echo pending    DVT Prophylaxis:  Heparin SC    Disposition: I expect the patient to be discharged TBD    CODE STATUS:   Code Status and Medical Interventions:   Ordered at: 02/09/20 1526     Code Status:    No CPR     Medical Interventions (Level of Support Prior to Arrest):    Full     Comments:    ok with intubation no CPR         Electronically signed by Yan Acuna MD, 02/10/20, 10:19 AM.

## 2020-02-10 NOTE — PROGRESS NOTES
Cardiology consult received for Afib with RVR, new onset. No documented Afib at this hospital, patient is in NSR rate 60s on telemetry. EKG not available, however has been ordered by me.  Per nursing staff, no Afib noted since admission. Patient transferred from OSH for Influenza, COPD and respiratory failure.  Per report, patient might have had AF at OSH, however records from OSH reviewed and no documented AF or EKGs available. Will be happy to see patient when/if AF occurs, otherwise will sign off. Please call if needed.     Data reviewed. Obviously at risk for AF but none documented. Needs only continued treatment of underlying risk facotrs for AF.

## 2020-02-10 NOTE — PLAN OF CARE
Problem: Patient Care Overview  Goal: Plan of Care Review  Flowsheets  Taken 2/10/2020 0800  Plan of Care Reviewed With: patient;daughter  Taken 2/10/2020 6457  Outcome Summary: Pt on 4L NC, VSS, anxious at times.  Pt c/o sob with any exertion.  Denies any pain. Poor appetite, nutritionist consulted. WOC seen for pressure sore on nasal bridge.  Specialty bed ordered d/t low mauro and poor appetite.  Pt refuses heel protectors. Pt has slept most of the day. Will continue to monitor.

## 2020-02-10 NOTE — PROGRESS NOTES
Discharge Planning Assessment  [facility]     Patient Name: Sheila Gipson  MRN: [id number]  Today's Date: 2/10/2020    Admit Date: 2/9/2020    Discharge Needs Assessment     Row Name 02/10/20 0948       Living Environment    Lives With  alone    Current Living Arrangements  home/apartment/condo    Primary Care Provided by  self    Able to Return to Prior Arrangements  yes       Transition Planning    Patient/Family Anticipates Transition to  home with family    Transportation Anticipated  family or friend will provide       Discharge Needs Assessment    Readmission Within the Last 30 Days  no previous admission in last 30 days    Concerns to be Addressed  discharge planning    Equipment Currently Used at Home  oxygen;nebulizer    Anticipated Changes Related to Illness  inability to care for self    Discharge Facility/Level of Care Needs  home with home health    Current Discharge Risk  chronically ill;lives alone;dependent with mobility/activities of daily living        Discharge Plan     Row Name 02/10/20 0949       Plan    Plan Comments  I met with Mrs. Rajan and her daughter Aliyah at the bedside. Mrs. Gipson lives in [facility] alone. She has a continuous oxygen concentrator and a nebulizer. She does not walker with any assistive devices, however she requires assistance with activities of daily living. Her sister transports her to her doctor appointments and her children help her with daily chores around the home. She is not currently followed by home health. PT/OT has been consulted for an evaluation. She will most likely need home health services or rehab at a skilled facility at discharge.        Destination      Coordination has not been started for this encounter.      Durable Medical Equipment      Coordination has not been started for this encounter.      Dialysis/Infusion      Coordination has not been started for this encounter.      Home Medical Care      Coordination has not been started for this  encounter.      Therapy      Coordination has not been started for this encounter.      Community Resources      Coordination has not been started for this encounter.          Demographic Summary     Row Name 02/10/20 0911       General Information    General Information Comments  I confirmed that Tu Eloy is Mrs. Gipson's PCP. [employer] is her insurer.        Functional Status     Row Name 02/10/20 0948       Functional Status    Usual Activity Tolerance  good       Functional Status, IADL    Medications  independent    Meal Preparation  assistive person    Housekeeping  completely dependent    Laundry  completely dependent    Shopping  completely dependent        Psychosocial    No documentation.       Abuse/Neglect    No documentation.       Legal    No documentation.       Substance Abuse    No documentation.       Patient Forms    No documentation.           Chalino Vasquez RN

## 2020-02-10 NOTE — CONSULTS
"                  Clinical Nutrition     Nutrition Assessment  Reason for Visit:   Identified at risk by screening criteria, Malnutrition Severity Assessment, MST score 2+, Unintentional weight loss    Patient Name: Sheila Gipson  YOB: 1950  MRN: [id number]  Date of Encounter: 02/10/20 12:00 PM  Admission date: 2/9/2020    Nutrition Assessment   Assessment     Admission Diagnosis  Acute respiratory failure with hypoxia   COPD with acute exacerbation   + influenza    Additional applicable diagnosis/conditions/procedures this adm:  + tobacco abuse (up to 3 ppd)  Benign essential tremor  Unintentional weight loss  Debility     Applicable PMH/PSxH:   HTN  Tremors     Reported/Observed/Food/Nutrition Related History:     Pt getting ready for transport at time of RD visit. Pt daughter present. Pt stated \" I just cannot breathe\". Pt dtr reports that she believes pt has lost ~ 15 lb over the past 2 months. Pt has been feeling very badly and not eating well. She has been drinking juice and she will take a few sips of boost supplements.     Pt PO intake affected by difficulty breathing, making it difficult for pt to consume adequate PO. Pt daughter would like for her to receive ONS while inpatient at this time, no other preferences.     Anthropometrics     Height: 167.6 cm (66\")  Last filed wt: Weight: 52.6 kg (116 lb) (02/09/20 2031)    BMI: 18.72 kg/m2   Normal: 18.5-24.9kg/m2    Ideal Body Weight (IBW) (kg): 59.58    Weight Change   UBW: pt dtr unsure of UBW? Believes pt has lost 15 lb in 2 months.      Labs reviewed   Yes  Hypomagnesemia   Results from last 7 days   Lab Units 02/10/20  0358 02/09/20  1533   GLUCOSE mg/dL 110* 116*   BUN mg/dL 42* 44*   CREATININE mg/dL 0.74 0.86   SODIUM mmol/L 140 144   CHLORIDE mmol/L 98 100   POTASSIUM mmol/L 4.1 4.3   MAGNESIUM mg/dL 2.7* 2.7*   ALT (SGPT) U/L  --  29     Results from last 7 days   Lab Units 02/09/20  1533   ALBUMIN g/dL 4.10     Lab Results   Lab " Value Date/Time    HGBA1C 5.90 (H) 02/09/2020 1533     Medications reviewed   Pertinent: azithromycin, rocephin, solu-medrol, nicotine, tamiflu  PRN: colace, pepcid, zofran     Current Nutrition Prescription     PO: Diet Regular; Consistent Carbohydrate     Oral Nutrition Supplement: Boost Plus BID     Intake: Insufficient data     Nutrition Diagnosis     2/10  Problem Unintended weight loss   Etiology Respiratory insufficiency; very poor PO intake   Signs/Symptoms Pt dtr reports 15 lb weight loss in 2 months (unable to quantify 2/2 no UBW known)     2/10  Problem Inadequate oral intake   Etiology Poor appetite on admission    Signs/Symptoms Pt has been consuming sips of juice and boost plus; inability to consume adequate nutrition at this time.        Nutrition Intervention   1.  Follow treatment progress, Care plan reviewed     2. Interview for preferences, Encourage intake     3. Boost Plus BID    4. If pt is unable to increase intake and tolerate PO within the next 48-72 hrs, pt would likely benefit from short term nutrition support to provide adequate nutrition until pt is able to consume baseline PO intake.     Goal:   General: Nutrition support treatment  PO: Tolerate PO, Increase intake  Additional goals: > 67% of meal trays, no further weight loss     Monitoring/Evaluation:   Per protocol, I&O, PO intake, Supplement intake, Pertinent labs, Weight, Symptoms    Will Continue to follow per protocol    Shanthi Sheridan RD  Time Spent: 30 minutes

## 2020-02-11 ENCOUNTER — APPOINTMENT (OUTPATIENT)
Dept: CARDIOLOGY | Facility: HOSPITAL | Age: 70
End: 2020-02-11

## 2020-02-11 LAB
BH CV ECHO MEAS - AO ROOT AREA (BSA CORRECTED): 1.9
BH CV ECHO MEAS - AO ROOT AREA: 6.8 CM^2
BH CV ECHO MEAS - AO ROOT DIAM: 2.9 CM
BH CV ECHO MEAS - BSA(HAYCOCK): 1.6 M^2
BH CV ECHO MEAS - BSA: 1.6 M^2
BH CV ECHO MEAS - BZI_BMI: 18.7 KILOGRAMS/M^2
BH CV ECHO MEAS - BZI_METRIC_HEIGHT: 167.6 CM
BH CV ECHO MEAS - BZI_METRIC_WEIGHT: 52.6 KG
BH CV ECHO MEAS - EDV(CUBED): 83.1 ML
BH CV ECHO MEAS - EDV(TEICH): 86 ML
BH CV ECHO MEAS - EF(CUBED): 66.2 %
BH CV ECHO MEAS - EF(TEICH): 58 %
BH CV ECHO MEAS - ESV(CUBED): 28.1 ML
BH CV ECHO MEAS - ESV(TEICH): 36.1 ML
BH CV ECHO MEAS - FS: 30.3 %
BH CV ECHO MEAS - IVS/LVPW: 0.96
BH CV ECHO MEAS - IVSD: 1.1 CM
BH CV ECHO MEAS - LA DIMENSION: 3.3 CM
BH CV ECHO MEAS - LA/AO: 1.1
BH CV ECHO MEAS - LAD MAJOR: 3.7 CM
BH CV ECHO MEAS - LAT PEAK E' VEL: 3.6 CM/SEC
BH CV ECHO MEAS - LATERAL E/E' RATIO: 13
BH CV ECHO MEAS - LV MASS(C)D: 174.9 GRAMS
BH CV ECHO MEAS - LV MASS(C)DI: 110.2 GRAMS/M^2
BH CV ECHO MEAS - LVIDD: 4.4 CM
BH CV ECHO MEAS - LVIDS: 3 CM
BH CV ECHO MEAS - LVOT AREA (M): 3.1 CM^2
BH CV ECHO MEAS - LVOT AREA: 3.2 CM^2
BH CV ECHO MEAS - LVOT DIAM: 2 CM
BH CV ECHO MEAS - LVPWD: 1.2 CM
BH CV ECHO MEAS - MED PEAK E' VEL: 5.5 CM/SEC
BH CV ECHO MEAS - MEDIAL E/E' RATIO: 8.7
BH CV ECHO MEAS - MV A MAX VEL: 69.1 CM/SEC
BH CV ECHO MEAS - MV DEC SLOPE: 337.3 CM/SEC^2
BH CV ECHO MEAS - MV DEC TIME: 0.23 SEC
BH CV ECHO MEAS - MV E MAX VEL: 48.9 CM/SEC
BH CV ECHO MEAS - MV E/A: 0.71
BH CV ECHO MEAS - MV P1/2T MAX VEL: 77.4 CM/SEC
BH CV ECHO MEAS - MV P1/2T: 67.2 MSEC
BH CV ECHO MEAS - MVA P1/2T LCG: 2.8 CM^2
BH CV ECHO MEAS - MVA(P1/2T): 3.3 CM^2
BH CV ECHO MEAS - PA ACC SLOPE: 438.4 CM/SEC^2
BH CV ECHO MEAS - PA ACC TIME: 0.17 SEC
BH CV ECHO MEAS - PA PR(ACCEL): 4.5 MMHG
BH CV ECHO MEAS - RAP SYSTOLE: 3 MMHG
BH CV ECHO MEAS - RV MAX PG: 0.79 MMHG
BH CV ECHO MEAS - RV V1 MAX: 44.4 CM/SEC
BH CV ECHO MEAS - RVSP: 25 MMHG
BH CV ECHO MEAS - SI(CUBED): 34.7 ML/M^2
BH CV ECHO MEAS - SI(TEICH): 31.4 ML/M^2
BH CV ECHO MEAS - SV(CUBED): 55 ML
BH CV ECHO MEAS - SV(TEICH): 49.9 ML
BH CV ECHO MEAS - TAPSE (>1.6): 2.4 CM2
BH CV ECHO MEAS - TR MAX PG: 22 MMHG
BH CV ECHO MEAS - TR MAX VEL: 233.5 CM/SEC
BH CV ECHO MEASUREMENTS AVERAGE E/E' RATIO: 10.75
BH CV XLRA - RV BASE: 2.7 CM
BH CV XLRA - RV LENGTH: 5.7 CM
BH CV XLRA - RV MID: 2.4 CM
BH CV XLRA - TDI S': 13.6 CM/SEC
LEFT ATRIUM VOLUME INDEX: 17.6 ML/M^2
LEFT ATRIUM VOLUME: 28 ML
LV EF 2D ECHO EST: 55 %
MAXIMAL PREDICTED HEART RATE: 150 BPM
STRESS TARGET HR: 128 BPM

## 2020-02-11 PROCEDURE — 99233 SBSQ HOSP IP/OBS HIGH 50: CPT | Performed by: INTERNAL MEDICINE

## 2020-02-11 PROCEDURE — 97166 OT EVAL MOD COMPLEX 45 MIN: CPT

## 2020-02-11 PROCEDURE — 25010000002 CEFTRIAXONE PER 250 MG: Performed by: INTERNAL MEDICINE

## 2020-02-11 PROCEDURE — 93306 TTE W/DOPPLER COMPLETE: CPT

## 2020-02-11 PROCEDURE — 94660 CPAP INITIATION&MGMT: CPT

## 2020-02-11 PROCEDURE — 93306 TTE W/DOPPLER COMPLETE: CPT | Performed by: INTERNAL MEDICINE

## 2020-02-11 PROCEDURE — 94799 UNLISTED PULMONARY SVC/PX: CPT

## 2020-02-11 PROCEDURE — 25010000002 HEPARIN (PORCINE) PER 1000 UNITS: Performed by: INTERNAL MEDICINE

## 2020-02-11 PROCEDURE — 25010000002 METHYLPREDNISOLONE PER 125 MG: Performed by: PHYSICIAN ASSISTANT

## 2020-02-11 PROCEDURE — 93005 ELECTROCARDIOGRAM TRACING: CPT | Performed by: INTERNAL MEDICINE

## 2020-02-11 RX ORDER — SIMETHICONE 80 MG
80 TABLET,CHEWABLE ORAL 4 TIMES DAILY PRN
Status: DISCONTINUED | OUTPATIENT
Start: 2020-02-11 | End: 2020-02-17 | Stop reason: HOSPADM

## 2020-02-11 RX ORDER — PROPRANOLOL HYDROCHLORIDE 10 MG/1
10 TABLET ORAL 2 TIMES DAILY
COMMUNITY

## 2020-02-11 RX ORDER — CETIRIZINE HYDROCHLORIDE 10 MG/1
10 TABLET ORAL DAILY
Status: DISCONTINUED | OUTPATIENT
Start: 2020-02-11 | End: 2020-02-17 | Stop reason: HOSPADM

## 2020-02-11 RX ADMIN — BUDESONIDE 0.5 MG: 0.5 INHALANT RESPIRATORY (INHALATION) at 08:06

## 2020-02-11 RX ADMIN — PROPRANOLOL HYDROCHLORIDE 10 MG: 10 TABLET ORAL at 08:27

## 2020-02-11 RX ADMIN — IPRATROPIUM BROMIDE AND ALBUTEROL SULFATE 3 ML: 2.5; .5 SOLUTION RESPIRATORY (INHALATION) at 08:05

## 2020-02-11 RX ADMIN — CETIRIZINE HYDROCHLORIDE 10 MG: 10 TABLET, FILM COATED ORAL at 14:07

## 2020-02-11 RX ADMIN — SODIUM CHLORIDE, PRESERVATIVE FREE 10 ML: 5 INJECTION INTRAVENOUS at 20:18

## 2020-02-11 RX ADMIN — AZITHROMYCIN MONOHYDRATE 250 MG: 250 TABLET ORAL at 08:27

## 2020-02-11 RX ADMIN — IPRATROPIUM BROMIDE AND ALBUTEROL SULFATE 3 ML: 2.5; .5 SOLUTION RESPIRATORY (INHALATION) at 11:44

## 2020-02-11 RX ADMIN — HEPARIN SODIUM 5000 UNITS: 5000 INJECTION, SOLUTION INTRAVENOUS; SUBCUTANEOUS at 05:18

## 2020-02-11 RX ADMIN — HEPARIN SODIUM 5000 UNITS: 5000 INJECTION, SOLUTION INTRAVENOUS; SUBCUTANEOUS at 22:37

## 2020-02-11 RX ADMIN — METHYLPREDNISOLONE SODIUM SUCCINATE 80 MG: 125 INJECTION, POWDER, FOR SOLUTION INTRAMUSCULAR; INTRAVENOUS at 05:18

## 2020-02-11 RX ADMIN — SODIUM CHLORIDE 125 ML/HR: 4.5 INJECTION, SOLUTION INTRAVENOUS at 08:28

## 2020-02-11 RX ADMIN — OSELTAMIVIR PHOSPHATE 30 MG: 30 CAPSULE ORAL at 08:27

## 2020-02-11 RX ADMIN — SODIUM CHLORIDE 125 ML/HR: 4.5 INJECTION, SOLUTION INTRAVENOUS at 17:16

## 2020-02-11 RX ADMIN — ALPRAZOLAM 0.25 MG: 0.25 TABLET ORAL at 05:21

## 2020-02-11 RX ADMIN — IPRATROPIUM BROMIDE AND ALBUTEROL SULFATE 3 ML: 2.5; .5 SOLUTION RESPIRATORY (INHALATION) at 19:34

## 2020-02-11 RX ADMIN — HEPARIN SODIUM 5000 UNITS: 5000 INJECTION, SOLUTION INTRAVENOUS; SUBCUTANEOUS at 14:08

## 2020-02-11 RX ADMIN — PROPRANOLOL HYDROCHLORIDE 10 MG: 10 TABLET ORAL at 20:16

## 2020-02-11 RX ADMIN — CEFTRIAXONE 1 G: 1 INJECTION, POWDER, FOR SOLUTION INTRAMUSCULAR; INTRAVENOUS at 16:24

## 2020-02-11 RX ADMIN — METHYLPREDNISOLONE SODIUM SUCCINATE 80 MG: 125 INJECTION, POWDER, FOR SOLUTION INTRAMUSCULAR; INTRAVENOUS at 17:16

## 2020-02-11 RX ADMIN — ARFORMOTEROL TARTRATE 15 MCG: 15 SOLUTION RESPIRATORY (INHALATION) at 08:05

## 2020-02-11 RX ADMIN — OSELTAMIVIR PHOSPHATE 30 MG: 30 CAPSULE ORAL at 20:17

## 2020-02-11 RX ADMIN — SIMETHICONE CHEW TAB 80 MG 80 MG: 80 TABLET ORAL at 14:07

## 2020-02-11 RX ADMIN — IPRATROPIUM BROMIDE AND ALBUTEROL SULFATE 3 ML: 2.5; .5 SOLUTION RESPIRATORY (INHALATION) at 16:06

## 2020-02-11 RX ADMIN — BUDESONIDE 0.5 MG: 0.5 INHALANT RESPIRATORY (INHALATION) at 19:34

## 2020-02-11 RX ADMIN — NICOTINE 1 PATCH: 14 PATCH, EXTENDED RELEASE TRANSDERMAL at 08:27

## 2020-02-11 NOTE — CONSULTS
NN has visited BS three times today to attempt consult completion.  Pt has either been asleep on bipap or getting a test completed.  NN will continue to follow to complete consult as soon as possible.

## 2020-02-11 NOTE — PLAN OF CARE
Problem: Patient Care Overview  Goal: Plan of Care Review  Outcome: Ongoing (interventions implemented as appropriate)  Flowsheets (Taken 2/11/2020 1809)  Progress: no change  Plan of Care Reviewed With: patient  Note:   Pt on 4l most of the day. Started eating a little bit more today,c/o gas pain this am.

## 2020-02-11 NOTE — PROGRESS NOTES
[facility] Medicine Services  PROGRESS NOTE    Patient Name: Sheila Gipson  : 1950  MRN: [id number]    Date of Admission: 2020  Primary Care Physician: Tu Lyosn MD    Subjective   Subjective     CC:  Follow-up for acute hypoxic respiratory failure    HPI:  Breathing feeling much better, is complaining of some sinus congestion, denies increased cough.    Review of Systems  Gen- No fevers, chills  CV- No chest pain, palpitations  GI- No N/V/D, abd pain     Objective   Objective     Vital Signs:   Temp:  [98.4 °F (36.9 °C)] 98.4 °F (36.9 °C)  Heart Rate:  [45-66] 53  Resp:  [18] 18  BP: (157-176)/(88-92) 157/92        Physical Exam:  Constitutional: No acute distress, awake, alert  HENT: NCAT, mucous membranes moist  Respiratory: Normal wheezing appreciated bilaterally, respiratory effort normal on 5 L nasal cannula  Cardiovascular: RRR, no murmurs, no lower extremity edema  Gastrointestinal: Positive bowel sounds, soft, nontender, nondistended  Psychiatric: Appropriate affect, cooperative  Neurologic: Oriented x 3, strength symmetric in all extremities, Cranial Nerves grossly intact to confrontation, speech clear  Skin: No rashes    Results Reviewed:  Results from last 7 days   Lab Units 02/10/20  0358 20  1619 20  1533   WBC 10*3/mm3 13.71* 12.41*  --    HEMOGLOBIN g/dL 15.4 15.5  --    HEMATOCRIT % 47.7* 47.9*  --    PLATELETS 10*3/mm3 169 183  --    INR   --   --  1.06     Results from last 7 days   Lab Units 02/10/20  0358 20  1533   SODIUM mmol/L 140 144   POTASSIUM mmol/L 4.1 4.3   CHLORIDE mmol/L 98 100   CO2 mmol/L 30.0* 32.0*   BUN mg/dL 42* 44*   CREATININE mg/dL 0.74 0.86   GLUCOSE mg/dL 110* 116*   CALCIUM mg/dL 8.7 9.1   ALT (SGPT) U/L  --  29   AST (SGOT) U/L  --  29   PROBNP pg/mL  --  1,363.0*     Estimated Creatinine Clearance: 54.3 mL/min (by IRINA-G formula based on SCr of 0.74 mg/dL).    Microbiology Results Abnormal      Procedure Component Value - Date/Time    Blood Culture - Blood, Arm, Right [[id number]] Collected:  02/09/20 1542    Lab Status:  Preliminary result Specimen:  Blood from Arm, Right Updated:  02/10/20 1631     Blood Culture No growth at 24 hours    Blood Culture - Blood, Arm, Left [[id number]] Collected:  02/09/20 1533    Lab Status:  Preliminary result Specimen:  Blood from Arm, Left Updated:  02/10/20 1631     Blood Culture No growth at 24 hours          Imaging Results (Last 24 Hours)     Procedure Component Value Units Date/Time    CT Angiogram Chest With & Without Contrast [[id number]] Collected:  02/10/20 1452     Updated:  02/10/20 1838    Narrative:       EXAMINATION: CT ANGIOGRAM CHEST W WO CONTRAST-02/10/2020:      INDICATION: Rule out PE, chest pain, shortness of breath.     TECHNIQUE: Multiple axial CT imaging was obtained of the chest from the  thoracic inlet through the adrenal glands following the administration  of intravenous contrast. Coronal and sagittal reformatted images were  submitted to further facilitate diagnostic accuracy and treatment  planning.     The radiation dose reduction device was turned on for each scan per the  ALARA (As Low as Reasonably Achievable) protocol.     COMPARISON: NONE.     FINDINGS: The thyroid is homogeneous in appearance. No mediastinal mass  or adenopathy. The cardiac chambers are within normal limits. No  pericardial effusion. No bulky hilar or axillary lymphadenopathy. No  filling defect to suggest evidence of pulmonary embolism. There are  atelectatic changes identified in the lung bases bilaterally. There is  no definite pleural effusion or pneumothorax. Degenerative changes seen  within the spine. Minimal vascular calcification seen within the  thoracic aorta.       Impression:       No PE, no acute intrathoracic abnormality.     D:  02/10/2020  E:  02/10/2020           This report was finalized on 2/10/2020 6:35 PM by Dr. Sara Wong MD.       XR Chest  1 View [[id number]] Collected:  02/09/20 1725     Updated:  02/10/20 1834    Narrative:       EXAMINATION: XR CHEST 1 VW-      INDICATION: Fever.      COMPARISON: 12/27/2010.     FINDINGS: Portable chest reveals cardiac and mediastinal silhouettes  within normal limits. Severe emphysematous and bullous changes are seen  in the upper lung fields. The lung fields are otherwise clear. No focal  parenchymal opacification is present.  No pleural effusion or  pneumothorax. The bony structures reveal degenerative changes seen  within the spine. Vascular calcification is seen within the thoracic  aorta.           Impression:       Severe emphysematous and chronic changes seen within the  lung fields with no evidence of acute parenchymal disease.     D:  02/09/2020  E:  02/10/2020     This report was finalized on 2/10/2020 6:31 PM by Dr. Sara Wong MD.                  I have reviewed the medications:  Scheduled Meds:  arformoterol 15 mcg Nebulization BID - RT   azithromycin 250 mg Oral Daily   budesonide 0.5 mg Nebulization BID - RT   cefTRIAXone 1 g Intravenous Q24H   cetirizine 10 mg Oral Daily   heparin (porcine) 5,000 Units Subcutaneous Q8H   ipratropium-albuterol 3 mL Nebulization 4x Daily - RT   methylPREDNISolone sodium succinate 80 mg Intravenous Q12H   nicotine 1 patch Transdermal Q24H   oseltamivir 30 mg Oral Q12H   pharmacy consult - MTM  Does not apply Daily   propranolol 10 mg Oral BID   sodium chloride 10 mL Intravenous Q12H     Continuous Infusions:  sodium chloride 125 mL/hr Last Rate: 125 mL/hr (02/11/20 0828)     PRN Meds:.•  acetaminophen **OR** acetaminophen **OR** acetaminophen  •  docusate sodium  •  famotidine  •  labetalol  •  melatonin  •  ondansetron **OR** ondansetron  •  simethicone  •  sodium chloride    Assessment/Plan   Assessment & Plan     Active Hospital Problems    Diagnosis  POA   • **Acute respiratory failure with hypoxia (CMS/HCC) [J96.01]  Yes   • Tobacco use [Z72.0]  Yes   •  COPD with acute exacerbation (CMS/HCC) [J44.1]  Yes   • Influenza [J11.1]  Yes   • Benign essential tremor [G25.0]  Yes   • Unintentional weight loss [R63.4]  Yes   • Essential hypertension [I10]  Yes   • Hypertensive urgency [I16.0]  Yes   • Debility [R53.81]  Yes      Resolved Hospital Problems   No resolved problems to display.        Brief Hospital Course to date:  Sheila Gipson is a 69 y.o. female With PMH significant for HTN, COPD, tobacco use who was admitted on 2/9 for acute on chronic hypoxic respiratory failure on 3L nasal canula at home.    Problems new to me today    Acute on chronic  hypoxic respiratory failure  CT PE negative for PE  -Continue Brovana, Pulmicort and duo nebs  -continue solumedrol 80 mg q12h  -ceftriaxone/azithromycin/tamiflu    Newly detected atrial fibrillation  -appears NSR on telemetry   -echo ordered   -EKG ordered    DVT Prophylaxis: Heparin    Disposition: I expect the patient to be discharged pending improvement in respiratory status.    CODE STATUS:   Code Status and Medical Interventions:   Ordered at: 02/09/20 1526     Code Status:    No CPR     Medical Interventions (Level of Support Prior to Arrest):    Full     Comments:    ok with intubation no CPR         Electronically signed by Ann-Marie Elliott MD, 02/11/20, 3:41 PM.

## 2020-02-11 NOTE — THERAPY EVALUATION
Acute Care - Occupational Therapy Initial Evaluation  [facility]     Patient Name: Sheila Gipson  : 1950  MRN: [id number]  Today's Date: 2020     Date of Referral to OT: 20       Admit Date: 2020       ICD-10-CM ICD-9-CM   1. Impaired mobility and ADLs Z74.09 799.89     Patient Active Problem List   Diagnosis   • Acute respiratory failure with hypoxia (CMS/HCC)   • Tobacco use   • COPD with acute exacerbation (CMS/HCC)   • Influenza   • Benign essential tremor   • Unintentional weight loss   • Essential hypertension   • Hypertensive urgency   • Debility     Past Medical History:   Diagnosis Date   • COPD (chronic obstructive pulmonary disease) (CMS/HCC)    • Hypertension      History reviewed. No pertinent surgical history.       OT ASSESSMENT FLOWSHEET (last 12 hours)      Occupational Therapy Evaluation     Row Name 20 1417                   OT Evaluation Time/Intention    Subjective Information  complains of;weakness;fatigue  -KF        Document Type  evaluation  -KF        Mode of Treatment  occupational therapy  -KF        Patient Effort  good  -KF        Symptoms Noted During/After Treatment  fatigue  -KF           General Information    Patient Profile Reviewed?  yes  -KF        Patient Observations  alert;cooperative;agree to therapy  -KF        Patient/Family Observations  dtr present  -KF        Prior Level of Function  independent:;all household mobility;transfer;bed mobility;ADL's;dressing;grooming;bathing  -KF        Equipment Currently Used at Home  oxygen;raised toilet;shower chair  -KF        Existing Precautions/Restrictions  fall;oxygen therapy device and L/min  -KF        Equipment Issued to Patient  utensils, large   -KF        Risks Reviewed  patient and family:;LOB;nausea/vomiting;dizziness;increased discomfort;change in vital signs  -KF        Benefits Reviewed  patient and family:;improve function;increase independence;increase strength;increase  balance;decrease pain;increase knowledge  -KF        Barriers to Rehab  medically complex  -KF           Relationship/Environment    Primary Source of Support/Comfort  child(wayne)  -KF        Lives With  alone  -KF           Resource/Environmental Concerns    Current Living Arrangements  home/apartment/condo  -KF           Home Main Entrance    Number of Stairs, Main Entrance  four;five  -KF        Stair Railings, Main Entrance  railing on left side (ascending)  -KF           Cognitive Assessment/Interventions    Additional Documentation  Cognitive Assessment/Intervention (Group)  -KF           Cognitive Assessment/Intervention- PT/OT    Affect/Mental Status (Cognitive)  WFL  -KF        Orientation Status (Cognition)  oriented x 3  -KF        Follows Commands (Cognition)  follows one step commands;over 90% accuracy;verbal cues/prompting required  -KF        Cognitive Function (Cognitive)  safety deficit  -KF        Safety Deficit (Cognitive)  mild deficit;awareness of need for assistance;insight into deficits/self awareness;judgment;safety precautions awareness  -KF        Cognitive Interventions (Cognitive)  occupation/activity based interventions;process/task specific training  -KF           Safety Issues, Functional Mobility    Safety Issues Affecting Function (Mobility)  insight into deficits/self awareness;judgment;safety precaution awareness  -KF        Impairments Affecting Function (Mobility)  balance;endurance/activity tolerance;strength;shortness of breath  -KF           Bed Mobility Assessment/Treatment    Bed Mobility Assessment/Treatment  rolling right;rolling left;scooting/bridging;supine-sit;sit-supine  -KF        Rolling Left [place] (Bed Mobility)  minimum assist (75% patient effort);verbal cues  -KF        Rolling Right [place] (Bed Mobility)  minimum assist (75% patient effort);verbal cues  -KF        Scooting/Bridging [place] (Bed Mobility)  minimum assist (75% patient  effort);verbal cues  -KF        Supine-Sit [place] (Bed Mobility)  minimum assist (75% patient effort);verbal cues  -KF        Sit-Supine [place] (Bed Mobility)  minimum assist (75% patient effort);verbal cues;nonverbal cues (demo/gesture)  -KF        Bed Mobility, Safety Issues  decreased use of arms for pushing/pulling;decreased use of legs for bridging/pushing  -KF        Assistive Device (Bed Mobility)  bed rails;head of bed elevated;draw sheet  -KF           Functional Mobility    Functional Mobility- Ind. Level  minimum assist (75% patient effort);verbal cues required  -KF        Functional Mobility- Device  rolling walker  -KF        Functional Mobility-Distance (Feet)  4  -KF        Functional Mobility- Safety Issues  supplemental O2;weight-shifting ability decreased;step length decreased;sequencing ability decreased  -KF        Functional Mobility- Comment  unsteady slight LOB requires min A overall   -KF           Transfer Assessment/Treatment    Transfer Assessment/Treatment  sit-stand transfer;stand-sit transfer  -KF        Comment (Transfers)  cues for seq and HP   -KF           Sit-Stand Transfer    Sit-Stand [place] (Transfers)  contact guard;verbal cues  -KF        Assistive Device (Sit-Stand Transfers)  walker, front-wheeled  -KF           Stand-Sit Transfer    Stand-Sit [place] (Transfers)  contact guard;verbal cues  -KF        Assistive Device (Stand-Sit Transfers)  walker, front-wheeled  -KF           ADL Assessment/Intervention    BADL Assessment/Intervention  lower body dressing  -KF           Lower Body Dressing Assessment/Training    Lower Body Dressing [place] Level  don;socks;dependent (less than 25% patient effort)  -KF        Lower Body Dressing Position  sitting up in bed  -KF        Comment (Lower Body Dressing)  at baseline doesn't wear sock but wears slip on shoes   -KF           BADL Safety/Performance    Impairments, BADL Safety/Performance   balance;endurance/activity tolerance;strength;trunk/postural control;range of motion  -KF        Skilled BADL Treatment/Intervention  BADL process/adaptation training;cognitive/safety deficit modifications  -KF           General ROM    GENERAL ROM COMMENTS  BUE WFL   -KF           MMT (Manual Muscle Testing)    General MMT Comments  RUE grossly 3+/5, LUE grossly 4-/5   -KF           Motor Assessment/Interventions    Additional Documentation  Balance Interventions (Group);Balance (Group);Fine Motor Testing & Training (Group);Gross Motor Coordination (Group)  -KF           Gross Motor Coordination    Gross Motor Impairments  AROM (active range of motion);coordination  -KF        Gross Motor Skill, Impairments Detail  mild deficits RUE   -KF           Balance    Balance  static sitting balance;static standing balance;dynamic standing balance;dynamic sitting balance  -KF           Static Sitting Balance    Level of [place] (Unsupported Sitting, Static Balance)  independent  -KF        Sitting Position (Unsupported Sitting, Static Balance)  sitting on edge of bed  -KF           Dynamic Sitting Balance    Level of [place], Reaches Outside Midline (Sitting, Dynamic Balance)  standby assist  -KF        Sitting Position, Reaches Outside Midline (Sitting, Dynamic Balance)  sitting on edge of bed  -KF           Static Standing Balance    Level of [place] (Supported Standing, Static Balance)  contact guard assist  -KF        Time Able to Maintain Position (Supported Standing, Static Balance)  1 to 2 minutes  -KF        Assistive Device Utilized (Supported Standing, Static Balance)  walker, rolling  -KF           Dynamic Standing Balance    Level of [place], Reaches Outside Midline (Standing, Dynamic Balance)  minimal assist, 75% patient effort  -KF        Time Able to Maintain Position, Reaches Outside Midline (Standing, Dynamic Balance)  15 to 30 seconds  -KF        Assistive Device Utilized (Supported  Standing, Dynamic Balance)  walker, rolling  -KF           Fine Motor Testing & Training    Training Activity, Fine Motor Coordination  manipulation of eating utensils;other (see comments) AROM   -KF        Comment, Fine Motor Coordination  moderate deficits R dominant hand   -KF           Sensory Assessment/Intervention    Sensory General Assessment  light touch sensation deficits identified  -KF        Additional Documentation  Vision Assessment/Intervention (Group)  -KF           Light Touch Sensation Assessment    Left Upper Extremity: Light Touch Sensation Assessment  intact  -KF        Right Upper Extremity: Light Touch Sensation Assessment  moderate impairment, 50 to 74% correct responses  -KF           Vision Assessment/Intervention    Visual Impairment/Limitations  corrective lenses full time  -KF           Positioning and Restraints    Pre-Treatment Position  in bed  -KF        Post Treatment Position  bed  -KF        In Bed  supine;notified nsg;fowlers;call light within reach;encouraged to call for assist;exit alarm on;with family/caregiver;side rails up x2 bed inflating   -KF           Pain Assessment    Additional Documentation  Pain Scale: Numbers Pre/Post-Treatment (Group)  -KF           Pain Scale: Numbers Pre/Post-Treatment    Pain Scale: Numbers, Pretreatment  8/10  -KF        Pain Scale: Numbers, Post-Treatment  0/10 - no pain  -KF        Pain Location - Orientation  generalized  -KF        Pain Location  abdomen  -KF        Pre/Post Treatment Pain Comment  denied pain post   -KF        Pain Intervention(s)  Repositioned;Ambulation/increased activity  -KF           Wound 02/09/20 1500 nose Pressure Injury    Wound - Properties Group Date first assessed: 02/09/20  -CS Time first assessed: 1500  -CS Present on Hospital Admission: Y  -CS Location: nose  -CS Primary Wound Type: Pressure inj  -CS Stage, Pressure Injury: Stage 1  -CS Additional Comments: healing pressure wound from use of bipap mask   -CS       Plan of Care Review    Plan of Care Reviewed With  patient;daughter  -KF        Progress  improving  -KF           Clinical Impression (OT)    Date of Referral to OT  02/09/20  -KF        OT Diagnosis  ADL decline   -KF        Patient/Family Goals Statement (OT Eval)  PLOF   -KF        Criteria for Skilled Therapeutic Interventions Met (OT Eval)  yes;treatment indicated  -KF        Rehab Potential (OT Eval)  good, to achieve stated therapy goals  -KF        Therapy Frequency (OT Eval)  daily  -KF        Care Plan Review (OT)  evaluation/treatment results reviewed;care plan/treatment goals reviewed;risks/benefits reviewed;current/potential barriers reviewed;patient/other agree to care plan  -KF        Care Plan Review, Other Participant (OT Eval)  family  -KF        Anticipated Equipment Needs at Discharge (OT)  tub bench;front wheeled walker;hospital bed TBD  -KF        Anticipated Discharge Disposition (OT)  inpatient rehabilitation facility  -KF           Vital Signs    Pre Systolic BP Rehab  -- RN cleared VSS   -KF        Pre SpO2 (%)  93  -KF        O2 Delivery Pre Treatment  supplemental O2  -KF        Intra SpO2 (%)  88  -KF        O2 Delivery Intra Treatment  supplemental O2  -KF        Post SpO2 (%)  91  -KF        O2 Delivery Post Treatment  supplemental O2  -KF        Pre Patient Position  Supine  -KF        Intra Patient Position  Standing  -KF        Post Patient Position  Supine  -KF           Planned OT Interventions    Planned Therapy Interventions (OT Eval)  activity tolerance training;adaptive equipment training;BADL retraining;cognitive/visual perception retraining;edema control/reduction;functional balance retraining;IADL retraining;neuromuscular control/coordination retraining;occupation/activity based interventions;patient/caregiver education/training;ROM/therapeutic exercise;strengthening exercise;transfer/mobility retraining  -KF           OT Goals    Bed Mobility Goal Selection (OT)   bed mobility, OT goal 1  -KF        Transfer Goal Selection (OT)  transfer, OT goal 1  -KF        Dressing Goal Selection (OT)  dressing, OT goal 1  -KF        Toileting Goal Selection (OT)  toileting, OT goal 1  -KF           Bed Mobility Goal 1 (OT)    Activity/Assistive Device (Bed Mobility Goal 1, OT)  sit to supine/supine to sit  -KF        [place] Level/Cues Needed (Bed Mobility Goal 1, OT)  supervision required  -KF        Time Frame (Bed Mobility Goal 1, OT)  long term goal (LTG);by discharge  -KF        Progress/Outcomes (Bed Mobility Goal 1, OT)  goal ongoing  -KF           Transfer Goal 1 (OT)    Activity/Assistive Device (Transfer Goal 1, OT)  bed-to-chair/chair-to-bed;commode;walker, rolling  -KF        [place] Level/Cues Needed (Transfer Goal 1, OT)  contact guard assist  -KF        Time Frame (Transfer Goal 1, OT)  long term goal (LTG);by discharge  -KF        Progress/Outcome (Transfer Goal 1, OT)  goal ongoing  -KF           Dressing Goal 1 (OT)    Activity/Assistive Device (Dressing Goal 1, OT)  lower body dressing brief with undergarment  -KF        [place]/Cues Needed (Dressing Goal 1, OT)  minimum assist (75% or more patient effort);verbal cues required  -KF        Time Frame (Dressing Goal 1, OT)  long term goal (LTG);by discharge  -KF        Progress/Outcome (Dressing Goal 1, OT)  goal ongoing  -KF           Toileting Goal 1 (OT)    Activity/Device (Toileting Goal 1, OT)  adjust/manage clothing;perform perineal hygiene;commode;grab bar/safety frame;commode, bedside without drop arms BSC for elevation  -KF        [place] Level/Cues Needed (Toileting Goal 1, OT)  minimum assist (75% or more patient effort);verbal cues required  -KF        Time Frame (Toileting Goal 1, OT)  long term goal (LTG);by discharge  -KF        Progress/Outcome (Toileting Goal 1, OT)  goal ongoing  -KF           Living Environment    Home Accessibility  stairs to enter home;other (see comments) has [place]  available   -KF          User Key  (r) = Recorded By, (t) = Taken By, (c) = Cosigned By    Initials Name Effective Dates    CS Gerri Mckeon RN 07/10/18 -     KF Rissa Key, OT 04/03/18 -                OT Recommendation and Plan  Outcome Summary/Treatment Plan (OT)  Anticipated Equipment Needs at Discharge (OT): tub bench, front wheeled walker, hospital bed(TBD)  Anticipated Discharge Disposition (OT): inpatient rehabilitation facility  Planned Therapy Interventions (OT Eval): activity tolerance training, adaptive equipment training, BADL retraining, cognitive/visual perception retraining, edema control/reduction, functional balance retraining, IADL retraining, neuromuscular control/coordination retraining, occupation/activity based interventions, patient/caregiver education/training, ROM/therapeutic exercise, strengthening exercise, transfer/mobility retraining  Therapy Frequency (OT Eval): daily  Plan of Care Review  Plan of Care Reviewed With: patient, daughter  Plan of Care Reviewed With: patient, daughter  Outcome Summary: OT eval completed Pt presents with deficits in strength, activity tolerance, balance, AROM, and coordination for ADL completion, recom IPOT d/c rehab pending progress, CGA STS at [facility], min A sidesteps at [facility] unsteadiness noted, max A LBD, min A bed mobility 4L NC 88% desats then recovers quickly to above 90%    Outcome Measures     Row Name 02/11/20 1417             How much help from another is currently needed...    Putting on and taking off regular lower body clothing?  2  -KF      Bathing (including washing, rinsing, and drying)  2  -KF      Toileting (which includes using toilet bed pan or urinal)  2  -KF      Putting on and taking off regular upper body clothing  3  -KF      Taking care of personal grooming (such as brushing teeth)  3  -KF      Eating meals  3  -KF      AM-PAC 6 Clicks Score (OT)  15  -KF         Functional Assessment    Outcome Measure Options  AM-PAC 6  Clicks Daily Activity (OT)  -        User Key  (r) = Recorded By, (t) = Taken By, (c) = Cosigned By    Initials Name Provider Type    Rissa Shoemaker OT Occupational Therapist          Time Calculation:   Time Calculation- OT     Row Name 02/11/20 1417             Time Calculation- OT    OT Start Time  1417  -      Total Timed Code Minutes- OT  0 minute(s)  -KF      OT Received On  02/11/20  -KF      OT Goal Re-Cert Due Date  02/21/20  -        User Key  (r) = Recorded By, (t) = Taken By, (c) = Cosigned By    Initials Name Provider Type    Rissa Shoemaker OT Occupational Therapist        Therapy Charges for Today     Code Description Service Date Service Provider Modifiers Qty    [id number]  OT EVAL MOD COMPLEXITY 4 2/11/2020 Rissa Key OT GO 1               Rissa Key OT  2/11/2020

## 2020-02-11 NOTE — PLAN OF CARE
Problem: Patient Care Overview  Goal: Plan of Care Review  Flowsheets (Taken 2/11/2020 9941)  Outcome Summary: OT eval completed Pt presents with deficits in strength, activity tolerance, balance, AROM, and coordination for ADL completion, recom IPOT d/c rehab pending progress, CGA STS at [facility], min A sidesteps at [facility] unsteadiness noted, max A LBD, min A bed mobility 4L NC 88% desats then recovers quickly to above 90%

## 2020-02-12 PROCEDURE — 94799 UNLISTED PULMONARY SVC/PX: CPT

## 2020-02-12 PROCEDURE — 25010000002 METHYLPREDNISOLONE PER 125 MG: Performed by: INTERNAL MEDICINE

## 2020-02-12 PROCEDURE — 93010 ELECTROCARDIOGRAM REPORT: CPT | Performed by: INTERNAL MEDICINE

## 2020-02-12 PROCEDURE — 99232 SBSQ HOSP IP/OBS MODERATE 35: CPT | Performed by: INTERNAL MEDICINE

## 2020-02-12 PROCEDURE — 25010000002 METHYLPREDNISOLONE PER 125 MG: Performed by: PHYSICIAN ASSISTANT

## 2020-02-12 PROCEDURE — 25010000002 HEPARIN (PORCINE) PER 1000 UNITS: Performed by: INTERNAL MEDICINE

## 2020-02-12 PROCEDURE — 25010000002 CEFTRIAXONE PER 250 MG: Performed by: INTERNAL MEDICINE

## 2020-02-12 PROCEDURE — 97162 PT EVAL MOD COMPLEX 30 MIN: CPT

## 2020-02-12 RX ORDER — METHYLPREDNISOLONE SODIUM SUCCINATE 125 MG/2ML
60 INJECTION, POWDER, LYOPHILIZED, FOR SOLUTION INTRAMUSCULAR; INTRAVENOUS EVERY 12 HOURS SCHEDULED
Status: DISCONTINUED | OUTPATIENT
Start: 2020-02-12 | End: 2020-02-14

## 2020-02-12 RX ORDER — BISACODYL 10 MG
10 SUPPOSITORY, RECTAL RECTAL DAILY PRN
Status: DISCONTINUED | OUTPATIENT
Start: 2020-02-12 | End: 2020-02-17 | Stop reason: HOSPADM

## 2020-02-12 RX ADMIN — CETIRIZINE HYDROCHLORIDE 10 MG: 10 TABLET, FILM COATED ORAL at 08:12

## 2020-02-12 RX ADMIN — SODIUM CHLORIDE 125 ML/HR: 4.5 INJECTION, SOLUTION INTRAVENOUS at 02:12

## 2020-02-12 RX ADMIN — IPRATROPIUM BROMIDE AND ALBUTEROL SULFATE 3 ML: 2.5; .5 SOLUTION RESPIRATORY (INHALATION) at 21:16

## 2020-02-12 RX ADMIN — PROPRANOLOL HYDROCHLORIDE 10 MG: 10 TABLET ORAL at 20:09

## 2020-02-12 RX ADMIN — ACETAMINOPHEN 650 MG: 325 TABLET ORAL at 05:03

## 2020-02-12 RX ADMIN — BUDESONIDE 0.5 MG: 0.5 INHALANT RESPIRATORY (INHALATION) at 08:44

## 2020-02-12 RX ADMIN — POLYETHYLENE GLYCOL 3350 17 G: 17 POWDER, FOR SOLUTION ORAL at 17:15

## 2020-02-12 RX ADMIN — AZITHROMYCIN MONOHYDRATE 250 MG: 250 TABLET ORAL at 08:12

## 2020-02-12 RX ADMIN — NICOTINE 1 PATCH: 14 PATCH, EXTENDED RELEASE TRANSDERMAL at 08:12

## 2020-02-12 RX ADMIN — IPRATROPIUM BROMIDE AND ALBUTEROL SULFATE 3 ML: 2.5; .5 SOLUTION RESPIRATORY (INHALATION) at 16:05

## 2020-02-12 RX ADMIN — IPRATROPIUM BROMIDE AND ALBUTEROL SULFATE 3 ML: 2.5; .5 SOLUTION RESPIRATORY (INHALATION) at 12:50

## 2020-02-12 RX ADMIN — SIMETHICONE CHEW TAB 80 MG 80 MG: 80 TABLET ORAL at 17:13

## 2020-02-12 RX ADMIN — SIMETHICONE CHEW TAB 80 MG 80 MG: 80 TABLET ORAL at 05:03

## 2020-02-12 RX ADMIN — BUDESONIDE 0.5 MG: 0.5 INHALANT RESPIRATORY (INHALATION) at 21:16

## 2020-02-12 RX ADMIN — CEFTRIAXONE 1 G: 1 INJECTION, POWDER, FOR SOLUTION INTRAMUSCULAR; INTRAVENOUS at 17:13

## 2020-02-12 RX ADMIN — SODIUM CHLORIDE 125 ML/HR: 4.5 INJECTION, SOLUTION INTRAVENOUS at 10:50

## 2020-02-12 RX ADMIN — OSELTAMIVIR PHOSPHATE 30 MG: 30 CAPSULE ORAL at 08:13

## 2020-02-12 RX ADMIN — SODIUM CHLORIDE 125 ML/HR: 4.5 INJECTION, SOLUTION INTRAVENOUS at 20:09

## 2020-02-12 RX ADMIN — DOCUSATE SODIUM 100 MG: 100 CAPSULE, LIQUID FILLED ORAL at 09:07

## 2020-02-12 RX ADMIN — HEPARIN SODIUM 5000 UNITS: 5000 INJECTION, SOLUTION INTRAVENOUS; SUBCUTANEOUS at 14:38

## 2020-02-12 RX ADMIN — SODIUM CHLORIDE, PRESERVATIVE FREE 10 ML: 5 INJECTION INTRAVENOUS at 08:12

## 2020-02-12 RX ADMIN — IPRATROPIUM BROMIDE AND ALBUTEROL SULFATE 3 ML: 2.5; .5 SOLUTION RESPIRATORY (INHALATION) at 08:45

## 2020-02-12 RX ADMIN — METHYLPREDNISOLONE SODIUM SUCCINATE 80 MG: 125 INJECTION, POWDER, FOR SOLUTION INTRAMUSCULAR; INTRAVENOUS at 05:21

## 2020-02-12 RX ADMIN — HEPARIN SODIUM 5000 UNITS: 5000 INJECTION, SOLUTION INTRAVENOUS; SUBCUTANEOUS at 21:35

## 2020-02-12 RX ADMIN — HEPARIN SODIUM 5000 UNITS: 5000 INJECTION, SOLUTION INTRAVENOUS; SUBCUTANEOUS at 05:21

## 2020-02-12 RX ADMIN — ARFORMOTEROL TARTRATE 15 MCG: 15 SOLUTION RESPIRATORY (INHALATION) at 21:16

## 2020-02-12 RX ADMIN — OSELTAMIVIR PHOSPHATE 30 MG: 30 CAPSULE ORAL at 20:09

## 2020-02-12 RX ADMIN — METHYLPREDNISOLONE SODIUM SUCCINATE 60 MG: 125 INJECTION, POWDER, FOR SOLUTION INTRAMUSCULAR; INTRAVENOUS at 17:14

## 2020-02-12 RX ADMIN — PROPRANOLOL HYDROCHLORIDE 10 MG: 10 TABLET ORAL at 08:13

## 2020-02-12 NOTE — THERAPY EVALUATION
Patient Name: Sheila Gipson  : 1950    MRN: [id number]                              Today's Date: 2020       Admit Date: 2020    Visit Dx:     ICD-10-CM ICD-9-CM   1. Impaired mobility and ADLs Z74.09 799.89     Patient Active Problem List   Diagnosis   • Acute respiratory failure with hypoxia (CMS/HCC)   • Tobacco use   • COPD with acute exacerbation (CMS/[facility])   • Influenza   • Benign essential tremor   • Unintentional weight loss   • Essential hypertension   • Hypertensive urgency   • Debility     Past Medical History:   Diagnosis Date   • COPD (chronic obstructive pulmonary disease) (CMS/[facility])    • Hypertension      History reviewed. No pertinent surgical history.  General Information     Row Name 20          PT Evaluation Time/Intention    Document Type  evaluation  -AA     Mode of Treatment  physical therapy  -AA     Row Name 20          General Information    Patient Profile Reviewed?  yes  -AA     Prior Level of Function  independent:;all household mobility;dependent:;driving  -AA     Existing Precautions/Restrictions  fall;oxygen therapy device and L/min  -AA     Barriers to Rehab  medically complex  -AA     Row Name 20          Relationship/Environment    Lives With  alone  -AA     Row Name 20          Resource/Environmental Concerns    Current Living Arrangements  home/apartment/condo  -AA     Row Name 20          Home Main Entrance    Number of Stairs, Main Entrance  four  -AA     Stair Railings, Main Entrance  railing on left side (ascending)  -AA     Row Name 20          Stairs Within Home, Primary    Number of Stairs, Within Home, Primary  none  -AA     Row Name 20          Cognitive Assessment/Intervention- PT/OT    Orientation Status (Cognition)  oriented x 4  -AA     Row Name 20          Safety Issues, Functional Mobility    Safety Issues Affecting Function (Mobility)  insight into deficits/self  awareness;awareness of need for assistance;problem solving;safety precaution awareness;safety precautions follow-through/compliance;sequencing abilities  -AA     Impairments Affecting Function (Mobility)  balance;endurance/activity tolerance;strength;shortness of breath  -AA       User Key  (r) = Recorded By, (t) = Taken By, (c) = Cosigned By    Initials Name Provider Type    AA Mehnaz Lopez, PT Physical Therapist        Mobility     Row Name 02/12/20 0814          Bed Mobility Assessment/Treatment    Bed Mobility Assessment/Treatment  rolling right;rolling left;scooting/bridging;supine-sit;sit-supine  -AA     Rolling Left [place] (Bed Mobility)  minimum assist (75% patient effort);verbal cues  -AA     Rolling Right [place] (Bed Mobility)  minimum assist (75% patient effort);verbal cues  -AA     Scooting/Bridging [place] (Bed Mobility)  minimum assist (75% patient effort);verbal cues  -AA     Supine-Sit [place] (Bed Mobility)  minimum assist (75% patient effort);verbal cues  -AA     Sit-Supine [place] (Bed Mobility)  minimum assist (75% patient effort);verbal cues;nonverbal cues (demo/gesture)  -     Assistive Device (Bed Mobility)  bed rails;head of bed elevated;draw sheet  -AA     Comment (Bed Mobility)  increased time with cues for PLB  -AA     Row Name 02/12/20 0814          Transfer Assessment/Treatment    Comment (Transfers)  VC for hand placement, posture, and proper breathing.    -AA     Row Name 02/12/20 0814          Sit-Stand Transfer    Sit-Stand [place] (Transfers)  contact guard;verbal cues  -AA     Assistive Device (Sit-Stand Transfers)  walker, front-wheeled  -AA     Row Name 02/12/20 0814          Gait/Stairs Assessment/Training    Gait/Stairs Assessment/Training  gait/ambulation independence;gait/ambulation assistive device;distance ambulated;gait pattern;gait deviations  -AA     [place] Level (Gait)  minimum assist (75% patient effort);verbal cues  -AA      Assistive Device (Gait)  walker, front-wheeled  -AA     Distance in Feet (Gait)  3  -AA     Pattern (Gait)  step-to  -AA     Deviations/Abnormal Patterns (Gait)  bilateral deviations;ericka decreased;steppage;stride length decreased  -AA     Bilateral Gait Deviations  forward flexed posture  -AA     Comment (Gait/Stairs)  Pt ambulated with RW and min A with slow stepping and increased forward flexed posture for respiratory musculature.  Pt sat >88% with mod SOB post 3 feet on 4L oxygen  -AA       User Key  (r) = Recorded By, (t) = Taken By, (c) = Cosigned By    Initials Name Provider Type    AA Mehnaz Lopez, PT Physical Therapist        Obj/Interventions     Row Name 02/12/20 0814          General ROM    GENERAL ROM COMMENTS  BLE WFL  -AA     Row Name 02/12/20 0814          MMT (Manual Muscle Testing)    General MMT Comments  BLE grossly assessed 3+/5  -AA     Row Name 02/12/20 0814          Static Sitting Balance    Level of [place] (Unsupported Sitting, Static Balance)  independent  -AA     Sitting Position (Unsupported Sitting, Static Balance)  sitting on edge of bed  -AA     Time Able to Maintain Position (Unsupported Sitting, Static Balance)  more than 5 minutes  -AA     Row Name 02/12/20 0814          Static Standing Balance    Level of [place] (Supported Standing, Static Balance)  contact guard assist  -AA     Time Able to Maintain Position (Supported Standing, Static Balance)  1 to 2 minutes  -AA     Assistive Device Utilized (Supported Standing, Static Balance)  walker, rolling  -AA     Row Name 02/12/20 0814          Dynamic Standing Balance    Level of [place], Reaches Outside Midline (Standing, Dynamic Balance)  minimal assist, 75% patient effort  -AA     Time Able to Maintain Position, Reaches Outside Midline (Standing, Dynamic Balance)  15 to 30 seconds  -AA     Assistive Device Utilized (Supported Standing, Dynamic Balance)  walker, rolling  -AA     Row Name 02/12/20 0814           Light Touch Sensation Assessment    Left Lower Extremity: Light Touch Sensation Assessment  mild impairment, 75% or more correct responses  -AA     Right Lower Extremity: Light Touch Sensation Assessment  mild impairment, 75% or more correct responses  -AA       User Key  (r) = Recorded By, (t) = Taken By, (c) = Cosigned By    Initials Name Provider Type    Mehnaz Chowdhury, PT Physical Therapist        Goals/Plan     Row Name 02/12/20 0814          Bed Mobility Goal 1 (PT)    Activity/Assistive Device (Bed Mobility Goal 1, PT)  bed mobility activities, all  -AA     [place] Level/Cues Needed (Bed Mobility Goal 1, PT)  conditional independence  -AA     Time Frame (Bed Mobility Goal 1, PT)  10 days  -AA     Row Name 02/12/20 0814          Transfer Goal 1 (PT)    Activity/Assistive Device (Transfer Goal 1, PT)  sit-to-stand/stand-to-sit;bed-to-chair/chair-to-bed  -AA     [place] Level/Cues Needed (Transfer Goal 1, PT)  conditional independence  -AA     Time Frame (Transfer Goal 1, PT)  10 days  -AA     Row Name 02/12/20 0814          Gait Training Goal 1 (PT)    Activity/Assistive Device (Gait Training Goal 1, PT)  gait (walking locomotion);assistive device use  -AA     [place] Level (Gait Training Goal 1, PT)  supervision required  -AA     Distance (Gait Goal 1, PT)  50'  -AA     Time Frame (Gait Training Goal 1, PT)  10 days  -AA     Row Name 02/12/20 0814          Stairs Goal 1 (PT)    Activity/Assistive Device (Stairs Goal 1, PT)  stairs, all skills  -AA     [place] Level/Cues Needed (Stairs Goal 1, PT)  contact guard assist  -AA     Number of Stairs (Stairs Goal 1, PT)  4  -AA     Time Frame (Stairs Goal 1, PT)  10 days  -AA       User Key  (r) = Recorded By, (t) = Taken By, (c) = Cosigned By    Initials Name Provider Type    Mehnaz Chowdhury, PT Physical Therapist        Clinical Impression     Row Name 02/12/20 0814          Pain Assessment    Additional Documentation  Pain Scale: Numbers  Pre/Post-Treatment (Group)  -AA     Row Name 02/12/20 0814          Pain Scale: Numbers Pre/Post-Treatment    Pain Scale: Numbers, Pretreatment  2/10  -AA     Pain Scale: Numbers, Post-Treatment  2/10  -AA     Pain Location - Orientation  generalized  -AA     Pain Location  abdomen  -AA     Pain Intervention(s)  Repositioned;Ambulation/increased activity  -AA     Row Name 02/12/20 0814          Plan of Care Review    Plan of Care Reviewed With  patient;daughter  -AA     Progress  improving  -AA     Row Name 02/12/20 0814          Physical Therapy Clinical Impression    Criteria for Skilled Interventions Met (PT Clinical Impression)  yes;treatment indicated  -AA     Rehab Potential (PT Clinical Summary)  fair, will monitor progress closely  -AA     Predicted Duration of Therapy (PT)  10 days  -AA     Row Name 02/12/20 0814          Vital Signs    Pre Systolic BP Rehab  179  -AA     Pre Treatment Diastolic BP  95  -AA     Pre SpO2 (%)  94  -AA     O2 Delivery Pre Treatment  supplemental O2  -AA     Intra SpO2 (%)  88  -AA     O2 Delivery Intra Treatment  supplemental O2  -AA     Post SpO2 (%)  94  -AA     O2 Delivery Post Treatment  supplemental O2  -AA     Pre Patient Position  Side Lying  -AA     Intra Patient Position  Standing  -AA     Post Patient Position  Side Lying  -AA     Row Name 02/12/20 0814          Positioning and Restraints    Pre-Treatment Position  in bed  -AA     Post Treatment Position  bed  -AA     In Bed  notified nsg;side lying right;call light within reach;encouraged to call for assist;exit alarm on;with family/caregiver;pillow between legs  -AA       User Key  (r) = Recorded By, (t) = Taken By, (c) = Cosigned By    Initials Name Provider Type    Mehnaz Chowdhury, PT Physical Therapist        Outcome Measures     Row Name 02/12/20 0814          How much help from another person do you currently need...    Turning from your back to your side while in flat bed without using bedrails?  3  -AA      Moving from lying on back to sitting on the side of a flat bed without bedrails?  3  -AA     Moving to and from a bed to a chair (including a wheelchair)?  3  -AA     Standing up from a chair using your arms (e.g., wheelchair, bedside chair)?  3  -AA     Climbing 3-5 steps with a railing?  2  -AA     To walk in hospital room?  2  -AA     AM-PAC 6 Clicks Score (PT)  16  -AA     Row Name 02/12/20 0814          Functional Assessment    Outcome Measure Options  AM-PAC 6 Clicks Basic Mobility (PT)  -       User Key  (r) = Recorded By, (t) = Taken By, (c) = Cosigned By    Initials Name Provider Type    Mehnaz Chowdhury PT Physical Therapist          PT Recommendation and Plan  Planned Therapy Interventions (PT Eval): balance training, bed mobility training, gait training, home exercise program, patient/family education, stair training, strengthening, transfer training  Outcome Summary/Treatment Plan (PT)  Anticipated Equipment Needs at Discharge (PT): four wheeled walker  Anticipated Discharge Disposition (PT): skilled nursing facility  Plan of Care Reviewed With: patient, daughter  Progress: improving  Outcome Summary: PT eval complete.  Pt presents with weakness, reduced mobility, and increased SOB warranting skilled IPPT services.  Pt perform bed mobility with min A, transfers with RW and CGA, and ambulation 3 feet min A with RW.  All activity on 4L with sat drop to 88% and quickly improve to 90% with rest.  Recommend DC to SNF for rehab, family and pt request home with home health.  If pt returns home, she will need a rollator upon DC     Time Calculation:   PT Charges     Row Name 02/12/20 0814             Time Calculation    Start Time  0814  -AA      PT Received On  02/12/20  -      PT Goal Re-Cert Due Date  02/22/20  -        User Key  (r) = Recorded By, (t) = Taken By, (c) = Cosigned By    Initials Name Provider Type    Mehnaz Chowdhury PT Physical Therapist        Therapy Charges for Today     Code  Description Service Date Service Provider Modifiers Qty    [id number] HC PT EVAL MOD COMPLEXITY 4 2/12/2020 Mehnaz Lopez, PT GP 1          PT G-Codes  Outcome Measure Options: AM-PAC 6 Clicks Basic Mobility (PT)  AM-PAC 6 Clicks Score (PT): 16  AM-PAC 6 Clicks Score (OT): 15    April KEITH Lopez PT  2/12/2020

## 2020-02-12 NOTE — PROGRESS NOTES
[facility]  PROGRESS NOTE    Patient Name: Sheila Gipson  : 1950  MRN: [id number]    Date of Admission: 2020  Primary Care Physician: Tu Lyons MD    Subjective   Subjective     CC:  Follow up acute on chronic respiratory failure with hypoxia, pneumonia    HPI:  Dyspnea improved today, feeling better and less congested. Denies significant chest pain, fever, or chills.    Review of Systems  Gen- No fevers, chills  CV- No palpitations, no lower extremity edema   Resp- No cough, or wheezing   GI- No N/V/D, abd pain     Objective   Objective     Vital Signs:   Temp:  [98 °F (36.7 °C)-98.6 °F (37 °C)] 98.4 °F (36.9 °C)  Heart Rate:  [46-62] 58  Resp:  [14-20] 18  BP: (137-187)/() 180/97        Physical Exam:  Constitutional: No acute distress, awake, alert  HENT: NCAT, mucous membranes moist  Respiratory:has crackles bilaterally, respiratory effort normal on 4 L nasal canula   Cardiovascular: irregular, no murmurs, no lower extremity edema   Gastrointestinal: Positive bowel sounds, soft, nontender, nondistended  Psychiatric: Appropriate affect, cooperative  Neurologic: Oriented x 3, Cranial Nerves grossly intact to confrontation, speech clear  Skin: No rashes    Results Reviewed:  Results from last 7 days   Lab Units 02/10/20  0358 20  1619 20  1533   WBC 10*3/mm3 13.71* 12.41*  --    HEMOGLOBIN g/dL 15.4 15.5  --    HEMATOCRIT % 47.7* 47.9*  --    PLATELETS 10*3/mm3 169 183  --    INR   --   --  1.06     Results from last 7 days   Lab Units 02/10/20  0358 20  1533   SODIUM mmol/L 140 144   POTASSIUM mmol/L 4.1 4.3   CHLORIDE mmol/L 98 100   CO2 mmol/L 30.0* 32.0*   BUN mg/dL 42* 44*   CREATININE mg/dL 0.74 0.86   GLUCOSE mg/dL 110* 116*   CALCIUM mg/dL 8.7 9.1   ALT (SGPT) U/L  --  29   AST (SGOT) U/L  --  29   PROBNP pg/mL  --  1,363.0*     Estimated Creatinine Clearance: 54.3 mL/min (by C-G formula based on SCr of 0.74  mg/dL).    Microbiology Results Abnormal     Procedure Component Value - Date/Time    Blood Culture - Blood, Arm, Left [[id number]] Collected:  02/09/20 1533    Lab Status:  Preliminary result Specimen:  Blood from Arm, Left Updated:  02/11/20 1631     Blood Culture No growth at 2 days    Blood Culture - Blood, Arm, Right [[id number]] Collected:  02/09/20 1542    Lab Status:  Preliminary result Specimen:  Blood from Arm, Right Updated:  02/11/20 1631     Blood Culture No growth at 2 days          Imaging Results (Last 24 Hours)     ** No results found for the last 24 hours. **          Results for orders placed during the hospital encounter of 02/09/20   Adult Transthoracic Echo Complete W/ Cont if Necessary Per Protocol    Narrative · Estimated EF = 55%.  · The cardiac valves are anatomically and functionally normal.          I have reviewed the medications:  Scheduled Meds:  arformoterol 15 mcg Nebulization BID - RT   azithromycin 250 mg Oral Daily   budesonide 0.5 mg Nebulization BID - RT   cefTRIAXone 1 g Intravenous Q24H   cetirizine 10 mg Oral Daily   heparin (porcine) 5,000 Units Subcutaneous Q8H   ipratropium-albuterol 3 mL Nebulization 4x Daily - RT   methylPREDNISolone sodium succinate 80 mg Intravenous Q12H   nicotine 1 patch Transdermal Q24H   oseltamivir 30 mg Oral Q12H   pharmacy consult - MTM  Does not apply Daily   propranolol 10 mg Oral BID   sodium chloride 10 mL Intravenous Q12H     Continuous Infusions:  sodium chloride 125 mL/hr Last Rate: 125 mL/hr (02/12/20 1050)     PRN Meds:.•  acetaminophen **OR** acetaminophen **OR** acetaminophen  •  docusate sodium  •  famotidine  •  labetalol  •  melatonin  •  ondansetron **OR** ondansetron  •  simethicone  •  sodium chloride    Assessment/Plan   Assessment & Plan     Active Hospital Problems    Diagnosis  POA   • **Acute respiratory failure with hypoxia (CMS/HCC) [J96.01]  Yes   • Tobacco use [Z72.0]  Yes   • COPD with acute exacerbation (CMS/HCC)  [J44.1]  Yes   • Influenza [J11.1]  Yes   • Benign essential tremor [G25.0]  Yes   • Unintentional weight loss [R63.4]  Yes   • Essential hypertension [I10]  Yes   • Hypertensive urgency [I16.0]  Yes   • Debility [R53.81]  Yes      Resolved Hospital Problems   No resolved problems to display.        Brief Hospital Course to date:  Sheila Gipson is a 69 y.o. female with PMH significant for HTN, COPD, tobacco use who was admitted on 2/9 for acute on chronic hypoxic respiratory failure on 3L nasal canula at home, improving slowly.    Acute on chronic  hypoxic respiratory failure  COPD  CT PE negative for PE  -Continue Brovana, Pulmicort and duo nebs - will continue these upon discharge  -decrease solumedrol to 60 mg IV BID   -ceftriaxone/azithromycin/tamiflu - last day of tamiflu      Sinus bradycardia with sinus arrhythmia   -EKG unchanged from previous in 2010   -Echo with EF wnl no diastolic dysfunction mentioned     Tremor - continue propanolol     DVT Prophylaxis: Heparin     Disposition: I expect the patient to be discharged pending improvement in respiratory status.    CODE STATUS:   Code Status and Medical Interventions:   Ordered at: 02/09/20 1526     Code Status:    No CPR     Medical Interventions (Level of Support Prior to Arrest):    Full     Comments:    ok with intubation no CPR         Electronically signed by Ann-Marie Elliott MD, 02/12/20, 2:40 PM.

## 2020-02-12 NOTE — DISCHARGE PLACEMENT REQUEST
"Case management [phone]  Needs short term skilled bed  Sheila Harrington (69 y.o. Female)     Date of Birth Social Security Number Address Home Phone MRN    1950  [facility] [address] [address] [phone] [id number]    Latter day Marital Status          None        Admission Date Admission Type Admitting Provider Attending Provider Department, Room/Bed    20 Urgent Ann-Marie Elliott MD Anciro, Audree, MD [facility] 4H, S474/1    Discharge Date Discharge Disposition Discharge Destination                       Attending Provider:  Ann-Marie Elliott MD    Allergies:  No Known Allergies    Isolation:  None   Infection:  None   Code Status:  No CPR    Ht:  167.6 cm (66\")   Wt:  52.6 kg (116 lb)    Admission Cmt:  None   Principal Problem:  Acute respiratory failure with hypoxia (CMS/[facility]) [J96.01]                 Active Insurance as of 2020     Primary Coverage     Payor Plan Insurance Group Employer/Plan Group    HUMANA MEDICARE REPLACEMENT HUMANA MEDICARE REPLACEMENT [id number]     Payor Plan Address Payor Plan Phone Number Payor Plan Fax Number Effective Dates    [address] [address] [phone]  2020 - None Entered    [facility] [id number]       Subscriber Name Subscriber Birth Date Member ID       SHEILA HARRINGTON 1950 [id number]                 Emergency Contacts      (Rel.) Home Phone Work Phone Mobile Phone    Cande Patton (Daughter) -- -- [phone]    Bekah Frey (Sister) -- -- [phone]    Aliyah Trevizo (Daughter) -- -- [phone]    Israel Mccarthy (Daughter) -- -- [phone]               History & Physical      Chato Felder MD at 20 [address] [place] [address] Medicine Services  HISTORY AND PHYSICAL    Patient Name: Sheila Harrington  : 1950  MRN: [id number]  Primary Care Physician: Provider, No Known  Date of admission: 2020      Subjective   Subjective     Chief Complaint:  Transfer " for flu, respiratory failure    HPI:  Sheila Gipson is a 69 y.o. female with past medical history of chronic respiratory failure, COPD, essential tremors, and hypertension who initially developed severe progressive shortness of breath on 2/4 associated with productive yellow sputum which was worsened with exertion and improved with rest and breathing treatments.  She had associated fevers and chills and flulike symptoms.  She presented to [facility] on 2/5 and was diagnosed with influenza and placed on BiPAP.  She reportedly has been receiving Tamiflu and BiPAP therapy along with steroids and nebulizers since that time.  Her daughter who lives in [place] requested she be transferred to [facility] for a higher level of care and to be closer to her family.  She reportedly has transitioned off BiPAP on a 100% nonrebreather just long enough to eat an occasional bite.  She except for a little juice she has not had any significant calorie intake for several days now.  She is developed a decubitus ulcer on the bridge of her nose and a Mepilex is currently on the bridge of her nose to prevent further skin breakdown.  She arrived at our hospital on a BiPAP with 35% oxygen.  She says her shortness of breath has improved.  She denies any confusion.  Her daughter is at the bedside.  She says her fevers and chills have improved.  She notes severe generalized weakness.  She also complains of a 15 pound weight loss over the last 2 months due to poor appetite.  She is a relatively poor historian.  Her daughter has assisted with some history.    Review of Systems   Constitutional: Positive for chills, fatigue and fever.   HENT: Negative for sinus pressure and sinus pain.    Eyes: Negative.    Respiratory: Positive for cough, shortness of breath and wheezing.    Cardiovascular: Negative for chest pain and palpitations.   Gastrointestinal: Negative for diarrhea, nausea and vomiting.   Endocrine: Negative.    Genitourinary: Negative  for dysuria and hematuria.   Musculoskeletal: Negative for neck pain and neck stiffness.   Skin: Negative for rash and wound.   Allergic/Immunologic: Negative.    Neurological: Negative for light-headedness and headaches.   Hematological: Negative for adenopathy. Does not bruise/bleed easily.   Psychiatric/Behavioral: Negative for confusion. The patient is not nervous/anxious.           Personal History     Past medical history reported: Tobacco use, COPD/emphysema, essential hypertension, and essential tremor    Denies previous surgeries    Family History:  pertinent FHx was reviewed and unremarkable.     Social History:    Patient currently smokes cigarettes but wants to quit.  Denies any alcohol use    Medications:  Available home medication information reviewed.  Reports home medicine currently propranolol 10 mg twice daily and albuterol inhaler    No known allergies    Objective   Objective     Vital Signs:   Heart Rate:  [60-63] 63  BP: (190)/(109) 190/109       Blood pressure 190/109  95% oxygen on BiPAP  Respirations 22    Physical Exam   Constitutional: Awake, alert  Eyes: PERRLA, sclerae anicteric, no conjunctival injection  HENT: nose bridge decub, NCAT, mucous membranes moist  Neck: Supple, no thyromegaly, no lymphadenopathy, trachea midline  Respiratory: Diffuse wheezing and rhonchi bilaterally, occasional productive cough, labored breathing with some accessory muscle use, tachypnea, currently on BiPAP at 35%o2  Cardiovascular: RRR, palpable radial pulses bilaterally  Gastrointestinal: Positive bowel sounds, soft, nontender, nondistended  Musculoskeletal: No bilateral ankle edema, no clubbing or cyanosis to extremities  Psychiatric: Appropriate affect, cooperative  Neurologic: No slurred speech or facial droop,  oriented  Skin: No rashes or jaundice      Results Reviewed:  Chest x-ray ordered and pending    Only outside hospital records sent was a medication list of hospitals medications being given at  [facility]  They include Solu-Medrol, Tamiflu, Levaquin, duo nebs, nicotine patch, propranolol    No other progress notes, labs, or radiology results available              Invalid input(s):  ALKPHOS  CrCl cannot be calculated (No successful lab value found.).  Brief Urine Lab Results     None        Imaging Results (Last 24 Hours)     Procedure Component Value Units Date/Time    XR Chest 1 View [[id number]] Resulted:  02/09/20 1538     Updated:  02/09/20 1538             Assessment/Plan   Assessment & Plan     Active Hospital Problems    Diagnosis POA   • **Acute respiratory failure with hypoxia (CMS/HCC) [J96.01] Yes   • Tobacco use [Z72.0] Yes   • COPD with acute exacerbation (CMS/HCC) [J44.1] Yes   • Influenza [J11.1] Yes   • Benign essential tremor [G25.0] Yes   • Unintentional weight loss [R63.4] Yes   • Essential hypertension [I10] Yes   • Hypertensive urgency [I16.0] Yes     69-year-old female admitted at [facility] on 2/5 with influenza, acute on chronic hypoxic respiratory failure requiring BiPAP and was transferred to [facility] for further medical management.    Acute on chronic hypoxic respiratory failure:  Due to influenza and COPD exacerbation.  Plan to try and wean off BiPAP today.  Check ABG.  Treat underlying issues.  Check chest x-ray.  No imaging studies from outside facility currently available.    Acute influenza:  Awaiting outside records to confirm diagnosis.  Continue Tamiflu.  Supportive care.  Possible concomitant bacterial bronchitis or pneumonia.  Also plan for azithromycin and ceftriaxone.  Stop Levaquin due to high side effect profile.    COPD exacerbation   Duo nebs, nebulized Pulmicort, nebulized Brovana, prednisone.  Wean oxygen as tolerated.    Unintentional weight loss: Concern for malnutrition.  Consult dietitian to assess.  Supplements.    Accelerated hypertension with underlying essential hypertension:  Continue home propranolol.  Labetalol as needed for blood pressure greater than  180.  Monitor and adjust as needed.    Essential tremor: Continue propranolol.  Monitor.    Tobacco use: Cessation counseled.  Nicotine patch.    Outside records are available reviewed.  Currently only and list of medications.  Request has been placed for full outside records.  Awaiting laboratory studies.  Follow-up on chest x-ray.  Follow-up on ABG.  Case discussed with respiratory therapy who is planning to try and wean her off of BiPAP.  PT OT ordered for debility.    DVT prophylaxis:  [facility]    CODE STATUS:    Code Status and Medical Interventions:   Ordered at: 20 1526     Code Status:    No CPR     Medical Interventions (Level of Support Prior to Arrest):    Full     Comments:    ok with intubation no CPR   CODE STATUS discussed with patient and daughter at the bedside.  Patient says she would not want CPR however if she needed 1 to 2 days on a ventilator she says she probably would not want this.  She will continue to think and discuss with her family regarding her ventilator wishes.  She reaffirmed wishes for no CPR with myself and the nurse present.    Admission Status:  I believe this patient meets INPATIENT status due to respiratory failure and influenza.  I feel patient’s risk for adverse outcomes and need for care warrant INPATIENT evaluation and I predict the patient’s care encounter to likely last beyond 2 midnights.      Electronically signed by Chato Felder MD, 20, 3:28 PM.      Electronically signed by Chato Felder MD at 20 1554          Physician Progress Notes (most recent note)      Mahesh Rob MD at 20 1826        EKG (we ordered yesterday) not in EPIC.  No telemetry strips since 2020 in Epic.        Electronically signed by Mahesh Rob MD at 20 1827          Physical Therapy Notes (most recent note)      Mehnaz Lopez, PT at 20 0814  Version 1 of 1         Patient Name: Sheila Gipson  : 1950    MRN: [id number]                               Today's Date: 2/12/2020       Admit Date: 2/9/2020    Visit Dx:     ICD-10-CM ICD-9-CM   1. Impaired mobility and ADLs Z74.09 799.89     Patient Active Problem List   Diagnosis   • Acute respiratory failure with hypoxia (CMS/HCC)   • Tobacco use   • COPD with acute exacerbation (CMS/HCC)   • Influenza   • Benign essential tremor   • Unintentional weight loss   • Essential hypertension   • Hypertensive urgency   • Debility     Past Medical History:   Diagnosis Date   • COPD (chronic obstructive pulmonary disease) (CMS/[facility])    • Hypertension      History reviewed. No pertinent surgical history.  General Information     Row Name 02/12/20 0814          PT Evaluation Time/Intention    Document Type  evaluation  -AA     Mode of Treatment  physical therapy  -AA     Row Name 02/12/20 0814          General Information    Patient Profile Reviewed?  yes  -AA     Prior Level of Function  independent:;all household mobility;dependent:;driving  -AA     Existing Precautions/Restrictions  fall;oxygen therapy device and L/min  -AA     Barriers to Rehab  medically complex  -AA     Row Name 02/12/20 0814          Relationship/Environment    Lives With  alone  -AA     Row Name 02/12/20 0814          Resource/Environmental Concerns    Current Living Arrangements  home/apartment/condo  -AA     Row Name 02/12/20 0814          Home Main Entrance    Number of Stairs, Main Entrance  four  -AA     Stair Railings, Main Entrance  railing on left side (ascending)  -AA     Row Name 02/12/20 0814          Stairs Within Home, Primary    Number of Stairs, Within Home, Primary  none  -AA     Row Name 02/12/20 0814          Cognitive Assessment/Intervention- PT/OT    Orientation Status (Cognition)  oriented x 4  -AA     Row Name 02/12/20 0814          Safety Issues, Functional Mobility    Safety Issues Affecting Function (Mobility)  insight into deficits/self awareness;awareness of need for assistance;problem solving;safety  precaution awareness;safety precautions follow-through/compliance;sequencing abilities  -AA     Impairments Affecting Function (Mobility)  balance;endurance/activity tolerance;strength;shortness of breath  -AA       User Key  (r) = Recorded By, (t) = Taken By, (c) = Cosigned By    Initials Name Provider Type    Mehnaz Chowdhury PT Physical Therapist        Mobility     Row Name 02/12/20 0814          Bed Mobility Assessment/Treatment    Bed Mobility Assessment/Treatment  rolling right;rolling left;scooting/bridging;supine-sit;sit-supine  -AA     Rolling Left [place] (Bed Mobility)  minimum assist (75% patient effort);verbal cues  -AA     Rolling Right [place] (Bed Mobility)  minimum assist (75% patient effort);verbal cues  -AA     Scooting/Bridging [place] (Bed Mobility)  minimum assist (75% patient effort);verbal cues  -AA     Supine-Sit [place] (Bed Mobility)  minimum assist (75% patient effort);verbal cues  -AA     Sit-Supine [place] (Bed Mobility)  minimum assist (75% patient effort);verbal cues;nonverbal cues (demo/gesture)  -AA     Assistive Device (Bed Mobility)  bed rails;head of bed elevated;draw sheet  -AA     Comment (Bed Mobility)  increased time with cues for PLB  -AA     Row Name 02/12/20 0814          Transfer Assessment/Treatment    Comment (Transfers)  VC for hand placement, posture, and proper breathing.    -AA     Row Name 02/12/20 0814          Sit-Stand Transfer    Sit-Stand [place] (Transfers)  contact guard;verbal cues  -AA     Assistive Device (Sit-Stand Transfers)  walker, front-wheeled  -AA     Row Name 02/12/20 0814          Gait/Stairs Assessment/Training    Gait/Stairs Assessment/Training  gait/ambulation independence;gait/ambulation assistive device;distance ambulated;gait pattern;gait deviations  -AA     [place] Level (Gait)  minimum assist (75% patient effort);verbal cues  -AA     Assistive Device (Gait)  walker, front-wheeled  -AA     Distance in  Feet (Gait)  3  -AA     Pattern (Gait)  step-to  -AA     Deviations/Abnormal Patterns (Gait)  bilateral deviations;ericka decreased;steppage;stride length decreased  -AA     Bilateral Gait Deviations  forward flexed posture  -AA     Comment (Gait/Stairs)  Pt ambulated with RW and min A with slow stepping and increased forward flexed posture for respiratory musculature.  Pt sat >88% with mod SOB post 3 feet on 4L oxygen  -AA       User Key  (r) = Recorded By, (t) = Taken By, (c) = Cosigned By    Initials Name Provider Type    Mehnaz Chowdhury, PT Physical Therapist        Obj/Interventions     Row Name 02/12/20 0814          General ROM    GENERAL ROM COMMENTS  BLE WFL  -AA     Row Name 02/12/20 0814          MMT (Manual Muscle Testing)    General MMT Comments  BLE grossly assessed 3+/5  -AA     Row Name 02/12/20 0814          Static Sitting Balance    Level of [place] (Unsupported Sitting, Static Balance)  independent  -AA     Sitting Position (Unsupported Sitting, Static Balance)  sitting on edge of bed  -AA     Time Able to Maintain Position (Unsupported Sitting, Static Balance)  more than 5 minutes  -AA     Row Name 02/12/20 0814          Static Standing Balance    Level of [place] (Supported Standing, Static Balance)  contact guard assist  -AA     Time Able to Maintain Position (Supported Standing, Static Balance)  1 to 2 minutes  -AA     Assistive Device Utilized (Supported Standing, Static Balance)  walker, rolling  -AA     Row Name 02/12/20 0814          Dynamic Standing Balance    Level of [place], Reaches Outside Midline (Standing, Dynamic Balance)  minimal assist, 75% patient effort  -AA     Time Able to Maintain Position, Reaches Outside Midline (Standing, Dynamic Balance)  15 to 30 seconds  -AA     Assistive Device Utilized (Supported Standing, Dynamic Balance)  walker, rolling  -AA     Row Name 02/12/20 0814          Light Touch Sensation Assessment    Left Lower Extremity: Light Touch  Sensation Assessment  mild impairment, 75% or more correct responses  -AA     Right Lower Extremity: Light Touch Sensation Assessment  mild impairment, 75% or more correct responses  -AA       User Key  (r) = Recorded By, (t) = Taken By, (c) = Cosigned By    Initials Name Provider Type    Mehnaz Chowdhury, PT Physical Therapist        Goals/Plan     Row Name 02/12/20 0814          Bed Mobility Goal 1 (PT)    Activity/Assistive Device (Bed Mobility Goal 1, PT)  bed mobility activities, all  -AA     [place] Level/Cues Needed (Bed Mobility Goal 1, PT)  conditional independence  -AA     Time Frame (Bed Mobility Goal 1, PT)  10 days  -AA     Row Name 02/12/20 0814          Transfer Goal 1 (PT)    Activity/Assistive Device (Transfer Goal 1, PT)  sit-to-stand/stand-to-sit;bed-to-chair/chair-to-bed  -AA     [place] Level/Cues Needed (Transfer Goal 1, PT)  conditional independence  -AA     Time Frame (Transfer Goal 1, PT)  10 days  -[facility] Name 02/12/20 0814          Gait Training Goal 1 (PT)    Activity/Assistive Device (Gait Training Goal 1, PT)  gait (walking locomotion);assistive device use  -AA     [place] Level (Gait Training Goal 1, PT)  supervision required  -AA     Distance (Gait Goal 1, PT)  50'  -AA     Time Frame (Gait Training Goal 1, PT)  10 days  -     Row Name 02/12/20 0814          Stairs Goal 1 (PT)    Activity/Assistive Device (Stairs Goal 1, PT)  stairs, all skills  -AA     [place] Level/Cues Needed (Stairs Goal 1, PT)  contact guard assist  -AA     Number of Stairs (Stairs Goal 1, PT)  4  -AA     Time Frame (Stairs Goal 1, PT)  10 days  -AA       User Key  (r) = Recorded By, (t) = Taken By, (c) = Cosigned By    Initials Name Provider Type    Mehnaz Chowdhury, PT Physical Therapist        Clinical Impression     Row Name 02/12/20 0814          Pain Assessment    Additional Documentation  Pain Scale: Numbers Pre/Post-Treatment (Group)  -[facility] Name 02/12/20 0814           Pain Scale: Numbers Pre/Post-Treatment    Pain Scale: Numbers, Pretreatment  2/10  -AA     Pain Scale: Numbers, Post-Treatment  2/10  -AA     Pain Location - Orientation  generalized  -AA     Pain Location  abdomen  -AA     Pain Intervention(s)  Repositioned;Ambulation/increased activity  -AA     Row Name 02/12/20 0814          Plan of Care Review    Plan of Care Reviewed With  patient;daughter  -AA     Progress  improving  -AA     Row Name 02/12/20 0814          Physical Therapy Clinical Impression    Criteria for Skilled Interventions Met (PT Clinical Impression)  yes;treatment indicated  -AA     Rehab Potential (PT Clinical Summary)  fair, will monitor progress closely  -AA     Predicted Duration of Therapy (PT)  10 days  -AA     Row Name 02/12/20 0814          Vital Signs    Pre Systolic BP Rehab  179  -AA     Pre Treatment Diastolic BP  95  -AA     Pre SpO2 (%)  94  -AA     O2 Delivery Pre Treatment  supplemental O2  -AA     Intra SpO2 (%)  88  -AA     O2 Delivery Intra Treatment  supplemental O2  -AA     Post SpO2 (%)  94  -AA     O2 Delivery Post Treatment  supplemental O2  -AA     Pre Patient Position  Side Lying  -AA     Intra Patient Position  Standing  -AA     Post Patient Position  Side Lying  -AA     Row Name 02/12/20 0814          Positioning and Restraints    Pre-Treatment Position  in bed  -AA     Post Treatment Position  bed  -AA     In Bed  notified nsg;side lying right;call light within reach;encouraged to call for assist;exit alarm on;with family/caregiver;pillow between legs  -AA       User Key  (r) = Recorded By, (t) = Taken By, (c) = Cosigned By    Initials Name Provider Type    AA Mehnaz Lopez, PT Physical Therapist        Outcome Measures     Row Name 02/12/20 0814          How much help from another person do you currently need...    Turning from your back to your side while in flat bed without using bedrails?  3  -AA     Moving from lying on back to sitting on the side of a flat bed  without bedrails?  3  -AA     Moving to and from a bed to a chair (including a wheelchair)?  3  -AA     Standing up from a chair using your arms (e.g., wheelchair, bedside chair)?  3  -AA     Climbing 3-5 steps with a railing?  2  -AA     To walk in hospital room?  2  -AA     AM-PAC 6 Clicks Score (PT)  16  -AA     Row Name 02/12/20 0814          Functional Assessment    Outcome Measure Options  AM-PAC 6 Clicks Basic Mobility (PT)  -       User Key  (r) = Recorded By, (t) = Taken By, (c) = Cosigned By    Initials Name Provider Type    Mehnaz Chowdhury, PT Physical Therapist          PT Recommendation and Plan  Planned Therapy Interventions (PT Eval): balance training, bed mobility training, gait training, home exercise program, patient/family education, stair training, strengthening, transfer training  Outcome Summary/Treatment Plan (PT)  Anticipated Equipment Needs at Discharge (PT): four wheeled walker  Anticipated Discharge Disposition (PT): skilled nursing facility  Plan of Care Reviewed With: patient, daughter  Progress: improving  Outcome Summary: PT eval complete.  Pt presents with weakness, reduced mobility, and increased SOB warranting skilled IPPT services.  Pt perform bed mobility with min A, transfers with RW and CGA, and ambulation 3 feet min A with RW.  All activity on 4L with sat drop to 88% and quickly improve to 90% with rest.  Recommend DC to SNF for rehab, family and pt request home with home health.  If pt returns home, she will need a rollator upon DC     Time Calculation:   PT Charges     Row Name 02/12/20 0814             Time Calculation    Start Time  0814  -AA      PT Received On  02/12/20  -      PT Goal Re-Cert Due Date  02/22/20  -AA        User Key  (r) = Recorded By, (t) = Taken By, (c) = Cosigned By    Initials Name Provider Type    Mehnaz Chowdhury PT Physical Therapist        Therapy Charges for Today     Code Description Service Date Service Provider Modifiers Qty     [id number]  PT EVAL MOD COMPLEXITY 4 2020 Mehnaz Lopez, PT GP 1          PT G-Codes  Outcome Measure Options: AM-PAC 6 Clicks Basic Mobility (PT)  AM-PAC 6 Clicks Score (PT): 16  AM-PAC 6 Clicks Score (OT): 15    April L John, PT  2020         Electronically signed by Mehnaz Lopez, PT at 20 0910          Occupational Therapy Notes (most recent note)      Rissa Key, OT at 20 1517          Acute Care - Occupational Therapy Initial Evaluation  [facility]     Patient Name: Sheila Gipson  : 1950  MRN: [id number]  Today's Date: 2020     Date of Referral to OT: 20       Admit Date: 2020       ICD-10-CM ICD-9-CM   1. Impaired mobility and ADLs Z74.09 799.89     Patient Active Problem List   Diagnosis   • Acute respiratory failure with hypoxia (CMS/HCC)   • Tobacco use   • COPD with acute exacerbation (CMS/HCC)   • Influenza   • Benign essential tremor   • Unintentional weight loss   • Essential hypertension   • Hypertensive urgency   • Debility     Past Medical History:   Diagnosis Date   • COPD (chronic obstructive pulmonary disease) (CMS/HCC)    • Hypertension      History reviewed. No pertinent surgical history.       OT ASSESSMENT FLOWSHEET (last 12 hours)      Occupational Therapy Evaluation     Row Name 20 1417                   OT Evaluation Time/Intention    Subjective Information  complains of;weakness;fatigue  -KF        Document Type  evaluation  -KF        Mode of Treatment  occupational therapy  -KF        Patient Effort  good  -KF        Symptoms Noted During/After Treatment  fatigue  -KF           General Information    Patient Profile Reviewed?  yes  -KF        Patient Observations  alert;cooperative;agree to therapy  -KF        Patient/Family Observations  dtr present  -KF        Prior Level of Function  independent:;all household mobility;transfer;bed mobility;ADL's;dressing;grooming;bathing  -KF        Equipment Currently Used at Home   oxygen;raised toilet;shower chair  -KF        Existing Precautions/Restrictions  fall;oxygen therapy device and L/min  -KF        Equipment Issued to Patient  utensils, large   -KF        Risks Reviewed  patient and family:;LOB;nausea/vomiting;dizziness;increased discomfort;change in vital signs  -KF        Benefits Reviewed  patient and family:;improve function;increase independence;increase strength;increase balance;decrease pain;increase knowledge  -KF        Barriers to Rehab  medically complex  -KF           Relationship/Environment    Primary Source of Support/Comfort  child(wayne)  -KF        Lives With  alone  -KF           Resource/Environmental Concerns    Current Living Arrangements  home/apartment/condo  -KF           Home Main Entrance    Number of Stairs, Main Entrance  four;five  -KF        Stair Railings, Main Entrance  railing on left side (ascending)  -KF           Cognitive Assessment/Interventions    Additional Documentation  Cognitive Assessment/Intervention (Group)  -KF           Cognitive Assessment/Intervention- PT/OT    Affect/Mental Status (Cognitive)  WFL  -KF        Orientation Status (Cognition)  oriented x 3  -KF        Follows Commands (Cognition)  follows one step commands;over 90% accuracy;verbal cues/prompting required  -KF        Cognitive Function (Cognitive)  safety deficit  -KF        Safety Deficit (Cognitive)  mild deficit;awareness of need for assistance;insight into deficits/self awareness;judgment;safety precautions awareness  -KF        Cognitive Interventions (Cognitive)  occupation/activity based interventions;process/task specific training  -KF           Safety Issues, Functional Mobility    Safety Issues Affecting Function (Mobility)  insight into deficits/self awareness;judgment;safety precaution awareness  -KF        Impairments Affecting Function (Mobility)  balance;endurance/activity tolerance;strength;shortness of breath  -KF           Bed Mobility  Assessment/Treatment    Bed Mobility Assessment/Treatment  rolling right;rolling left;scooting/bridging;supine-sit;sit-supine  -KF        Rolling Left [place] (Bed Mobility)  minimum assist (75% patient effort);verbal cues  -KF        Rolling Right [place] (Bed Mobility)  minimum assist (75% patient effort);verbal cues  -KF        Scooting/Bridging [place] (Bed Mobility)  minimum assist (75% patient effort);verbal cues  -KF        Supine-Sit [place] (Bed Mobility)  minimum assist (75% patient effort);verbal cues  -KF        Sit-Supine [place] (Bed Mobility)  minimum assist (75% patient effort);verbal cues;nonverbal cues (demo/gesture)  -KF        Bed Mobility, Safety Issues  decreased use of arms for pushing/pulling;decreased use of legs for bridging/pushing  -KF        Assistive Device (Bed Mobility)  bed rails;head of bed elevated;draw sheet  -KF           Functional Mobility    Functional Mobility- Ind. Level  minimum assist (75% patient effort);verbal cues required  -KF        Functional Mobility- Device  rolling walker  -KF        Functional Mobility-Distance (Feet)  4  -KF        Functional Mobility- Safety Issues  supplemental O2;weight-shifting ability decreased;step length decreased;sequencing ability decreased  -KF        Functional Mobility- Comment  unsteady slight LOB requires min A overall   -KF           Transfer Assessment/Treatment    Transfer Assessment/Treatment  sit-stand transfer;stand-sit transfer  -KF        Comment (Transfers)  cues for seq and HP   -KF           Sit-Stand Transfer    Sit-Stand [place] (Transfers)  contact guard;verbal cues  -KF        Assistive Device (Sit-Stand Transfers)  walker, front-wheeled  -KF           Stand-Sit Transfer    Stand-Sit [place] (Transfers)  contact guard;verbal cues  -KF        Assistive Device (Stand-Sit Transfers)  walker, front-wheeled  -KF           ADL Assessment/Intervention    BADL Assessment/Intervention  lower  body dressing  -KF           Lower Body Dressing Assessment/Training    Lower Body Dressing [place] Level  don;socks;dependent (less than 25% patient effort)  -KF        Lower Body Dressing Position  sitting up in bed  -KF        Comment (Lower Body Dressing)  at baseline doesn't wear sock but wears slip on shoes   -KF           BADL Safety/Performance    Impairments, BADL Safety/Performance  balance;endurance/activity tolerance;strength;trunk/postural control;range of motion  -KF        Skilled BADL Treatment/Intervention  BADL process/adaptation training;cognitive/safety deficit modifications  -KF           General ROM    GENERAL ROM COMMENTS  BUE WFL   -KF           MMT (Manual Muscle Testing)    General MMT Comments  RUE grossly 3+/5, LUE grossly 4-/5   -KF           Motor Assessment/Interventions    Additional Documentation  Balance Interventions (Group);Balance (Group);Fine Motor Testing & Training (Group);Gross Motor Coordination (Group)  -KF           Gross Motor Coordination    Gross Motor Impairments  AROM (active range of motion);coordination  -KF        Gross Motor Skill, Impairments Detail  mild deficits RUE   -KF           Balance    Balance  static sitting balance;static standing balance;dynamic standing balance;dynamic sitting balance  -KF           Static Sitting Balance    Level of [place] (Unsupported Sitting, Static Balance)  independent  -KF        Sitting Position (Unsupported Sitting, Static Balance)  sitting on edge of bed  -KF           Dynamic Sitting Balance    Level of [place], Reaches Outside Midline (Sitting, Dynamic Balance)  standby assist  -KF        Sitting Position, Reaches Outside Midline (Sitting, Dynamic Balance)  sitting on edge of bed  -KF           Static Standing Balance    Level of [place] (Supported Standing, Static Balance)  contact guard assist  -KF        Time Able to Maintain Position (Supported Standing, Static Balance)  1 to 2 minutes  -KF         Assistive Device Utilized (Supported Standing, Static Balance)  walker, rolling  -KF           Dynamic Standing Balance    Level of [place], Reaches Outside Midline (Standing, Dynamic Balance)  minimal assist, 75% patient effort  -KF        Time Able to Maintain Position, Reaches Outside Midline (Standing, Dynamic Balance)  15 to 30 seconds  -KF        Assistive Device Utilized (Supported Standing, Dynamic Balance)  walker, rolling  -KF           Fine Motor Testing & Training    Training Activity, Fine Motor Coordination  manipulation of eating utensils;other (see comments) AROM   -KF        Comment, Fine Motor Coordination  moderate deficits R dominant hand   -KF           Sensory Assessment/Intervention    Sensory General Assessment  light touch sensation deficits identified  -KF        Additional Documentation  Vision Assessment/Intervention (Group)  -KF           Light Touch Sensation Assessment    Left Upper Extremity: Light Touch Sensation Assessment  intact  -KF        Right Upper Extremity: Light Touch Sensation Assessment  moderate impairment, 50 to 74% correct responses  -KF           Vision Assessment/Intervention    Visual Impairment/Limitations  corrective lenses full time  -KF           Positioning and Restraints    Pre-Treatment Position  in bed  -KF        Post Treatment Position  bed  -KF        In Bed  supine;notified nsg;fowlers;call light within reach;encouraged to call for assist;exit alarm on;with family/caregiver;side rails up x2 bed inflating   -KF           Pain Assessment    Additional Documentation  Pain Scale: Numbers Pre/Post-Treatment (Group)  -KF           Pain Scale: Numbers Pre/Post-Treatment    Pain Scale: Numbers, Pretreatment  8/10  -KF        Pain Scale: Numbers, Post-Treatment  0/10 - no pain  -KF        Pain Location - Orientation  generalized  -KF        Pain Location  abdomen  -KF        Pre/Post Treatment Pain Comment  denied pain post   -KF        Pain Intervention(s)   Repositioned;Ambulation/increased activity  -KF           Wound 02/09/20 1500 nose Pressure Injury    Wound - Properties Group Date first assessed: 02/09/20  -CS Time first assessed: 1500  -CS Present on Hospital Admission: Y  -CS Location: nose  -CS Primary Wound Type: Pressure inj  -CS Stage, Pressure Injury: Stage 1  -CS Additional Comments: healing pressure wound from use of bipap mask  -CS       Plan of Care Review    Plan of Care Reviewed With  patient;daughter  -KF        Progress  improving  -KF           Clinical Impression (OT)    Date of Referral to OT  02/09/20  -KF        OT Diagnosis  ADL decline   -KF        Patient/Family Goals Statement (OT Eval)  PLOF   -KF        Criteria for Skilled Therapeutic Interventions Met (OT Eval)  yes;treatment indicated  -KF        Rehab Potential (OT Eval)  good, to achieve stated therapy goals  -KF        Therapy Frequency (OT Eval)  daily  -KF        Care Plan Review (OT)  evaluation/treatment results reviewed;care plan/treatment goals reviewed;risks/benefits reviewed;current/potential barriers reviewed;patient/other agree to care plan  -KF        Care Plan Review, Other Participant (OT Eval)  family  -KF        Anticipated Equipment Needs at Discharge (OT)  tub bench;front wheeled walker;hospital bed TBD  -KF        Anticipated Discharge Disposition (OT)  inpatient rehabilitation facility  -KF           Vital Signs    Pre Systolic BP Rehab  -- RN cleared VSS   -KF        Pre SpO2 (%)  93  -KF        O2 Delivery Pre Treatment  supplemental O2  -KF        Intra SpO2 (%)  88  -KF        O2 Delivery Intra Treatment  supplemental O2  -KF        Post SpO2 (%)  91  -KF        O2 Delivery Post Treatment  supplemental O2  -KF        Pre Patient Position  Supine  -KF        Intra Patient Position  Standing  -KF        Post Patient Position  Supine  -KF           Planned OT Interventions    Planned Therapy Interventions (OT Eval)  activity tolerance training;adaptive equipment  training;BADL retraining;cognitive/visual perception retraining;edema control/reduction;functional balance retraining;IADL retraining;neuromuscular control/coordination retraining;occupation/activity based interventions;patient/caregiver education/training;ROM/therapeutic exercise;strengthening exercise;transfer/mobility retraining  -KF           OT Goals    Bed Mobility Goal Selection (OT)  bed mobility, OT goal 1  -KF        Transfer Goal Selection (OT)  transfer, OT goal 1  -KF        Dressing Goal Selection (OT)  dressing, OT goal 1  -KF        Toileting Goal Selection (OT)  toileting, OT goal 1  -KF           Bed Mobility Goal 1 (OT)    Activity/Assistive Device (Bed Mobility Goal 1, OT)  sit to supine/supine to sit  -KF        [place] Level/Cues Needed (Bed Mobility Goal 1, OT)  supervision required  -KF        Time Frame (Bed Mobility Goal 1, OT)  long term goal (LTG);by discharge  -KF        Progress/Outcomes (Bed Mobility Goal 1, OT)  goal ongoing  -KF           Transfer Goal 1 (OT)    Activity/Assistive Device (Transfer Goal 1, OT)  bed-to-chair/chair-to-bed;commode;walker, rolling  -KF        [place] Level/Cues Needed (Transfer Goal 1, OT)  contact guard assist  -KF        Time Frame (Transfer Goal 1, OT)  long term goal (LTG);by discharge  -KF        Progress/Outcome (Transfer Goal 1, OT)  goal ongoing  -KF           Dressing Goal 1 (OT)    Activity/Assistive Device (Dressing Goal 1, OT)  lower body dressing brief with undergarment  -KF        [place]/Cues Needed (Dressing Goal 1, OT)  minimum assist (75% or more patient effort);verbal cues required  -KF        Time Frame (Dressing Goal 1, OT)  long term goal (LTG);by discharge  -KF        Progress/Outcome (Dressing Goal 1, OT)  goal ongoing  -KF           Toileting Goal 1 (OT)    Activity/Device (Toileting Goal 1, OT)  adjust/manage clothing;perform perineal hygiene;commode;grab bar/safety frame;commode, bedside without drop arms BSC for  elevation  -KF        [place] Level/Cues Needed (Toileting Goal 1, OT)  minimum assist (75% or more patient effort);verbal cues required  -KF        Time Frame (Toileting Goal 1, OT)  long term goal (LTG);by discharge  -KF        Progress/Outcome (Toileting Goal 1, OT)  goal ongoing  -KF           Living Environment    Home Accessibility  stairs to enter home;other (see comments) has [place] available   -KF          User Key  (r) = Recorded By, (t) = Taken By, (c) = Cosigned By    Initials Name Effective Dates    CS Gerri Mckeon, RN 07/10/18 -     KF Rissa Key, OT 04/03/18 -                OT Recommendation and Plan  Outcome Summary/Treatment Plan (OT)  Anticipated Equipment Needs at Discharge (OT): tub bench, front wheeled walker, hospital bed(TBD)  Anticipated Discharge Disposition (OT): inpatient rehabilitation facility  Planned Therapy Interventions (OT Eval): activity tolerance training, adaptive equipment training, BADL retraining, cognitive/visual perception retraining, edema control/reduction, functional balance retraining, IADL retraining, neuromuscular control/coordination retraining, occupation/activity based interventions, patient/caregiver education/training, ROM/therapeutic exercise, strengthening exercise, transfer/mobility retraining  Therapy Frequency (OT Eval): daily  Plan of Care Review  Plan of Care Reviewed With: patient, daughter  Plan of Care Reviewed With: patient, daughter  Outcome Summary: OT eval completed Pt presents with deficits in strength, activity tolerance, balance, AROM, and coordination for ADL completion, recom IPOT d/c rehab pending progress, CGA STS at FWW, min A sidesteps at FWW unsteadiness noted, max A LBD, min A bed mobility 4L NC 88% desats then recovers quickly to above 90%    Outcome Measures     Row Name 02/11/20 2936             How much help from another is currently needed...    Putting on and taking off regular lower body clothing?  2  -KF       Bathing (including washing, rinsing, and drying)  2  -KF      Toileting (which includes using toilet bed pan or urinal)  2  -KF      Putting on and taking off regular upper body clothing  3  -KF      Taking care of personal grooming (such as brushing teeth)  3  -KF      Eating meals  3  -KF      AM-PAC 6 Clicks Score (OT)  15  -KF         Functional Assessment    Outcome Measure Options  AM-PAC 6 Clicks Daily Activity (OT)  -KF        User Key  (r) = Recorded By, (t) = Taken By, (c) = Cosigned By    Initials Name Provider Type    Rissa Shoemaker OT Occupational Therapist          Time Calculation:   Time Calculation- OT     Row Name 02/11/20 1417             Time Calculation- OT    OT Start Time  1417  -KF      Total Timed Code Minutes- OT  0 minute(s)  -KF      OT Received On  02/11/20  -KF      OT Goal Re-Cert Due Date  02/21/20  -KF        User Key  (r) = Recorded By, (t) = Taken By, (c) = Cosigned By    Initials Name Provider Type    Rissa Shoemaker OT Occupational Therapist        Therapy Charges for Today     Code Description Service Date Service Provider Modifiers Qty    [id number]  OT EVAL MOD COMPLEXITY 4 2/11/2020 Rissa Key OT GO 1               Rissa Key OT  2/11/2020    Electronically signed by Rissa Key OT at 02/11/20 8601

## 2020-02-12 NOTE — PLAN OF CARE
Problem: Patient Care Overview  Goal: Plan of Care Review  Outcome: Ongoing (interventions implemented as appropriate)  Flowsheets  Taken 2/12/2020 0311  Progress: no change  Outcome Summary: VS WNL. Pt on 4LNC with BiPAP at bedside for nightly use. Pt sat > 90% on 4L. Lung sounds diminished in all lobes. bowel sounds active. no c/o pain or shortness of breath. Pt daughter at bedside and attentive to patient. Pt has 0.45% NS at 125ml/hr. Bruising and discoloration noted to upper extremity. Normal pulses palpated in lower extremities. Will continue to monitor.  Taken 2/11/2020 2017  Plan of Care Reviewed With: patient;daughter

## 2020-02-12 NOTE — PROGRESS NOTES
Continued Stay Note  [facility]     Patient Name: Sheila Gipson  MRN: [id number]  Today's Date: 2/12/2020    Admit Date: 2/9/2020    Discharge Plan     Row Name 02/12/20 1410       Plan    Plan  skilled nursing facility    Patient/Family in Agreement with Plan  yes    Plan Comments  I met with Mrs. Rajan and her daughter Aliyah at the bedside. I also spoke with her daughter Marta on the phone. Therapy is recommending that Mrs. Rajan go to a skilled facility for rehab after this hospitalization. I discussed options with them. They would like referrals made to [facility] in [facility] in [facility], and [facility] in [place], [place]. I have made these referrals. This will require insurance approval.     Final Discharge Disposition Code  03 - skilled nursing facility (SNF)        Discharge Codes    No documentation.             Chalino Vasquez RN

## 2020-02-12 NOTE — PLAN OF CARE
Problem: Patient Care Overview  Goal: Plan of Care Review  Outcome: Ongoing (interventions implemented as appropriate)  Flowsheets (Taken 2/12/2020 0814)  Outcome Summary: PT eval complete.  Pt presents with weakness, reduced mobility, and increased SOB warranting skilled IPPT services.  Pt perform bed mobility with min A, transfers with RW and CGA, and ambulation 3 feet min A with RW.  All activity on 4L with sat drop to 88% and quickly improve to 90% with rest.  Recommend DC to SNF for rehab, family and pt request home with home health.  If pt returns home, she will need a rollator upon DC

## 2020-02-12 NOTE — CONSULTS
Referring Provider: MD Emerita  Reason for Consultation: AECOPD    Subjective .   Education:  NN has visited the patient on numerous times to complete consult.  To date, no education has been able to be completed.  The patient has reported that she has no issues getting medications or transportation to appointments at this time.  She reports that she is able to ambulate ~ 20 feet before experiencing SOB.  Pt states that she uses rescue medications about every 4 hours and feels relief within 2-3 mins.  NN will continue to follow to attempt education at the most appropriate time.  NN will continue to follow.    Age: 69 y.o.  Sex: female  Smoker Status: former, 100-150 pack years  Pulmonologist: ASA  FEV1 (PFT): NA  Home O2: 3L PRN    Objective     SpO2 SpO2: 90 % (02/12/20 0845)  Device Device (Oxygen Therapy): (P) nasal cannula, humidified (02/12/20 1050)  Flow Flow (L/min): 3 (02/12/20 1050)  Incentive Spirometer    IS Predicted Level (mL)     Number of Repetitions     Level Incentive Spirometer (mL)    Patient Tolerance     Inhaler Treatment Status    Treatment Route        Home Medications:  Medications Prior to Admission   Medication Sig Dispense Refill Last Dose   • Doxylamine-PSE-APAP (TYLENOL SINUS NIGHT TIME PO) Take 1 tablet by mouth Every Night.      • propranolol (INDERAL) 10 MG tablet Take 10 mg by mouth 2 (Two) Times a Day.          Discussion: Per current GOLD Standards, please consider: No LAMA in place (pt reports she is on Symbicort and Ventolin), outpatient PFT, outpatient LDCT per [place] screen (70 yo+ 100 pack years), NRT at [place], pulmonary rehab          Elaine Flores RN

## 2020-02-13 PROCEDURE — 25010000002 METHYLPREDNISOLONE PER 125 MG: Performed by: INTERNAL MEDICINE

## 2020-02-13 PROCEDURE — 25010000002 CEFTRIAXONE PER 250 MG: Performed by: INTERNAL MEDICINE

## 2020-02-13 PROCEDURE — 25010000002 HEPARIN (PORCINE) PER 1000 UNITS: Performed by: INTERNAL MEDICINE

## 2020-02-13 PROCEDURE — 94799 UNLISTED PULMONARY SVC/PX: CPT

## 2020-02-13 PROCEDURE — 97110 THERAPEUTIC EXERCISES: CPT

## 2020-02-13 PROCEDURE — 99232 SBSQ HOSP IP/OBS MODERATE 35: CPT | Performed by: INTERNAL MEDICINE

## 2020-02-13 PROCEDURE — 25010000002 ONDANSETRON PER 1 MG: Performed by: INTERNAL MEDICINE

## 2020-02-13 RX ADMIN — ONDANSETRON 4 MG: 2 INJECTION INTRAMUSCULAR; INTRAVENOUS at 20:23

## 2020-02-13 RX ADMIN — HEPARIN SODIUM 5000 UNITS: 5000 INJECTION, SOLUTION INTRAVENOUS; SUBCUTANEOUS at 20:23

## 2020-02-13 RX ADMIN — SODIUM CHLORIDE 125 ML/HR: 4.5 INJECTION, SOLUTION INTRAVENOUS at 14:45

## 2020-02-13 RX ADMIN — PROPRANOLOL HYDROCHLORIDE 10 MG: 10 TABLET ORAL at 20:23

## 2020-02-13 RX ADMIN — IPRATROPIUM BROMIDE AND ALBUTEROL SULFATE 3 ML: 2.5; .5 SOLUTION RESPIRATORY (INHALATION) at 15:59

## 2020-02-13 RX ADMIN — CEFTRIAXONE 1 G: 1 INJECTION, POWDER, FOR SOLUTION INTRAMUSCULAR; INTRAVENOUS at 16:13

## 2020-02-13 RX ADMIN — POLYETHYLENE GLYCOL 3350 17 G: 17 POWDER, FOR SOLUTION ORAL at 08:54

## 2020-02-13 RX ADMIN — CETIRIZINE HYDROCHLORIDE 10 MG: 10 TABLET, FILM COATED ORAL at 08:54

## 2020-02-13 RX ADMIN — SODIUM CHLORIDE, PRESERVATIVE FREE 10 ML: 5 INJECTION INTRAVENOUS at 08:55

## 2020-02-13 RX ADMIN — AZITHROMYCIN MONOHYDRATE 250 MG: 250 TABLET ORAL at 08:54

## 2020-02-13 RX ADMIN — FAMOTIDINE 20 MG: 20 TABLET ORAL at 06:40

## 2020-02-13 RX ADMIN — BUDESONIDE 0.5 MG: 0.5 INHALANT RESPIRATORY (INHALATION) at 08:01

## 2020-02-13 RX ADMIN — IPRATROPIUM BROMIDE AND ALBUTEROL SULFATE 3 ML: 2.5; .5 SOLUTION RESPIRATORY (INHALATION) at 12:53

## 2020-02-13 RX ADMIN — HEPARIN SODIUM 5000 UNITS: 5000 INJECTION, SOLUTION INTRAVENOUS; SUBCUTANEOUS at 14:34

## 2020-02-13 RX ADMIN — METHYLPREDNISOLONE SODIUM SUCCINATE 60 MG: 125 INJECTION, POWDER, FOR SOLUTION INTRAMUSCULAR; INTRAVENOUS at 18:15

## 2020-02-13 RX ADMIN — SIMETHICONE CHEW TAB 80 MG 80 MG: 80 TABLET ORAL at 06:40

## 2020-02-13 RX ADMIN — HEPARIN SODIUM 5000 UNITS: 5000 INJECTION, SOLUTION INTRAVENOUS; SUBCUTANEOUS at 05:28

## 2020-02-13 RX ADMIN — BUDESONIDE 0.5 MG: 0.5 INHALANT RESPIRATORY (INHALATION) at 19:51

## 2020-02-13 RX ADMIN — ARFORMOTEROL TARTRATE 15 MCG: 15 SOLUTION RESPIRATORY (INHALATION) at 19:50

## 2020-02-13 RX ADMIN — PROPRANOLOL HYDROCHLORIDE 10 MG: 10 TABLET ORAL at 08:54

## 2020-02-13 RX ADMIN — METHYLPREDNISOLONE SODIUM SUCCINATE 60 MG: 125 INJECTION, POWDER, FOR SOLUTION INTRAMUSCULAR; INTRAVENOUS at 05:28

## 2020-02-13 RX ADMIN — MELATONIN TAB 5 MG 5 MG: 5 TAB at 20:23

## 2020-02-13 RX ADMIN — ARFORMOTEROL TARTRATE 15 MCG: 15 SOLUTION RESPIRATORY (INHALATION) at 08:04

## 2020-02-13 RX ADMIN — SODIUM CHLORIDE 125 ML/HR: 4.5 INJECTION, SOLUTION INTRAVENOUS at 04:31

## 2020-02-13 RX ADMIN — IPRATROPIUM BROMIDE AND ALBUTEROL SULFATE 3 ML: 2.5; .5 SOLUTION RESPIRATORY (INHALATION) at 19:51

## 2020-02-13 RX ADMIN — IPRATROPIUM BROMIDE AND ALBUTEROL SULFATE 3 ML: 2.5; .5 SOLUTION RESPIRATORY (INHALATION) at 08:00

## 2020-02-13 NOTE — DISCHARGE PLACEMENT REQUEST
"Sheila Harrington (69 y.o. Female)     Date of Birth Social Security Number Address Home Phone MRN    1950  [facility] [place] [id number] [phone] [id number]    Congregational Marital Status          None        Admission Date Admission Type Admitting Provider Attending Provider Department, Room/Bed    20 Urgent Ann-Marie Elliott MD Anciro, Audree, MD [address], S474/1    Discharge Date Discharge Disposition Discharge Destination                       Attending Provider:  Ann-Marie Elliott MD    Allergies:  No Known Allergies    Isolation:  None   Infection:  None   Code Status:  No CPR    Ht:  167.6 cm (66\")   Wt:  52.6 kg (116 lb)    Admission Cmt:  None   Principal Problem:  Acute respiratory failure with hypoxia (CMS/HCC) [J96.01]                 Active Insurance as of 2020     Primary Coverage     Payor Plan Insurance Group Employer/Plan Group    HUMANA MEDICARE REPLACEMENT HUMANA MEDICARE REPLACEMENT [id number]     Payor Plan Address Payor Plan Phone Number Payor Plan Fax Number Effective Dates    [address] [address] [phone]  2020 - None Entered    [facility] [id number]       Subscriber Name Subscriber Birth Date Member ID       SHEILA HARRINGTON 1950 [id number]                 Emergency Contacts      (Rel.) Home Phone Work Phone Mobile Phone    Cande Patton (Daughter) -- -- [phone]    TkBekah (Sister) -- -- [phone]    Aliyah Trevizo (Daughter) -- -- [phone]    Israel Mccarthy (Daughter) -- -- [phone]               Physician Progress Notes (most recent note)      Ann-Marie Elliott MD at 20 1247              [facility] Medicine Services  PROGRESS NOTE    Patient Name: Sheila Harrington  : 1950  MRN: [id number]    Date of Admission: 2020  Primary Care Physician: Tu Lyons MD    Subjective   Subjective     CC:  Follow up for influenza infection and COPD    HPI:  No acute " events overnight. Sat up to chair this morning without issues. Dyspnea slowly improving. Denies cough.    Review of Systems  Gen- No fevers, chills  CV- No chest pain, palpitations  GI- No N/V/D, abd pain     Objective   Objective     Vital Signs:   Temp:  [97.2 °F (36.2 °C)-99.8 °F (37.7 °C)] 98.3 °F (36.8 °C)  Heart Rate:  [54-92] 76  Resp:  [12-20] 16  BP: (142-164)/(81-99) 142/99        Physical Exam:  Constitutional: No acute distress, awake, alert  HENT: NCAT, mucous membranes moist  Respiratory: still having minimal wheezing, but improved from previously, respiratory effort normal on nasal canula   Cardiovascular: RRR, no murmurs, no lower extremity edema   Gastrointestinal: Positive bowel sounds, soft, nontender, nondistended  Psychiatric: Appropriate affect, cooperative  Neurologic: Oriented x 3, Cranial Nerves grossly intact to confrontation, speech clear  Skin: No rashes on exposed skin    Results Reviewed:  Results from last 7 days   Lab Units 02/10/20  0358 02/09/20  1619 02/09/20  1533   WBC 10*3/mm3 13.71* 12.41*  --    HEMOGLOBIN g/dL 15.4 15.5  --    HEMATOCRIT % 47.7* 47.9*  --    PLATELETS 10*3/mm3 169 183  --    INR   --   --  1.06     Results from last 7 days   Lab Units 02/10/20  0358 02/09/20  1533   SODIUM mmol/L 140 144   POTASSIUM mmol/L 4.1 4.3   CHLORIDE mmol/L 98 100   CO2 mmol/L 30.0* 32.0*   BUN mg/dL 42* 44*   CREATININE mg/dL 0.74 0.86   GLUCOSE mg/dL 110* 116*   CALCIUM mg/dL 8.7 9.1   ALT (SGPT) U/L  --  29   AST (SGOT) U/L  --  29   PROBNP pg/mL  --  1,363.0*     Estimated Creatinine Clearance: 54.3 mL/min (by C-G formula based on SCr of 0.74 mg/dL).    Microbiology Results Abnormal     Procedure Component Value - Date/Time    Blood Culture - Blood, Arm, Left [[id number]] Collected:  02/09/20 1533    Lab Status:  Preliminary result Specimen:  Blood from Arm, Left Updated:  02/12/20 1631     Blood Culture No growth at 3 days    Blood Culture - Blood, Arm, Right [[id number]]  Collected:  02/09/20 1542    Lab Status:  Preliminary result Specimen:  Blood from Arm, Right Updated:  02/12/20 1631     Blood Culture No growth at 3 days          Imaging Results (Last 24 Hours)     ** No results found for the last 24 hours. **          Results for orders placed during the hospital encounter of 02/09/20   Adult Transthoracic Echo Complete W/ Cont if Necessary Per Protocol    Narrative · Estimated EF = 55%.  · The cardiac valves are anatomically and functionally normal.          I have reviewed the medications:  Scheduled Meds:  arformoterol 15 mcg Nebulization BID - RT   budesonide 0.5 mg Nebulization BID - RT   cefTRIAXone 1 g Intravenous Q24H   cetirizine 10 mg Oral Daily   heparin (porcine) 5,000 Units Subcutaneous Q8H   ipratropium-albuterol 3 mL Nebulization 4x Daily - RT   methylPREDNISolone sodium succinate 60 mg Intravenous Q12H   nicotine 1 patch Transdermal Q24H   pharmacy consult - MTM  Does not apply Daily   polyethylene glycol 17 g Oral Daily   propranolol 10 mg Oral BID   sodium chloride 10 mL Intravenous Q12H     Continuous Infusions:  sodium chloride 125 mL/hr Last Rate: 125 mL/hr (02/13/20 0431)     PRN Meds:.•  acetaminophen **OR** acetaminophen **OR** acetaminophen  •  bisacodyl  •  docusate sodium  •  famotidine  •  labetalol  •  melatonin  •  ondansetron **OR** ondansetron  •  simethicone  •  sodium chloride    Assessment/Plan   Assessment & Plan     Active Hospital Problems    Diagnosis  POA   • **Acute respiratory failure with hypoxia (CMS/HCC) [J96.01]  Yes   • Tobacco use [Z72.0]  Yes   • COPD with acute exacerbation (CMS/HCC) [J44.1]  Yes   • Influenza [J11.1]  Yes   • Benign essential tremor [G25.0]  Yes   • Unintentional weight loss [R63.4]  Yes   • Essential hypertension [I10]  Yes   • Hypertensive urgency [I16.0]  Yes   • Debility [R53.81]  Yes      Resolved Hospital Problems   No resolved problems to display.        Brief Hospital Course to date:  Sheila Gipson is  a 69 y.o. female with PMH significant for HTN, COPD, tobacco use who was admitted on  for acute on chronic hypoxic respiratory failure on 3L nasal canula at home, slowly improving      Acute on chronic  hypoxic respiratory failure   COPD  CT PE negative for PE  -Continue Brovana, Pulmicort and duo nebs - will continue these upon discharge  -continue solumedrol to 60 mg IV BID   -continue ceftriaxone/azithromycin x 7 days total - completed tamiflu      Sinus bradycardia with sinus arrhythmia - stable   -EKG unchanged from previous in    -Echo with EF wnl no diastolic dysfunction mentioned      Tremor - stable - continue propanolol     DVT Prophylaxis: Heparin     Disposition: I expect the patient to be discharged pending placement.    CODE STATUS:   Code Status and Medical Interventions:   Ordered at: 20 1526     Code Status:    No CPR     Medical Interventions (Level of Support Prior to Arrest):    Full     Comments:    ok with intubation no CPR         Electronically signed by Ann-Marie Elliott MD, 20, 12:47 PM.        Electronically signed by Ann-Marie Elliott MD at 20 1252          Physical Therapy Notes (most recent note)      Mehnaz Lopez, PT at 20 0814  Version 1 of 1         Patient Name: Sheila Gipson  : 1950    MRN: [id number]                              Today's Date: 2020       Admit Date: 2020    Visit Dx:     ICD-10-CM ICD-9-CM   1. Impaired mobility and ADLs Z74.09 799.89     Patient Active Problem List   Diagnosis   • Acute respiratory failure with hypoxia (CMS/HCC)   • Tobacco use   • COPD with acute exacerbation (CMS/HCC)   • Influenza   • Benign essential tremor   • Unintentional weight loss   • Essential hypertension   • Hypertensive urgency   • Debility     Past Medical History:   Diagnosis Date   • COPD (chronic obstructive pulmonary disease) (CMS/HCC)    • Hypertension      History reviewed. No pertinent surgical history.  General Information      Row Name 02/12/20 0814          PT Evaluation Time/Intention    Document Type  evaluation  -AA     Mode of Treatment  physical therapy  -AA     Row Name 02/12/20 0814          General Information    Patient Profile Reviewed?  yes  -AA     Prior Level of Function  independent:;all household mobility;dependent:;driving  -AA     Existing Precautions/Restrictions  fall;oxygen therapy device and L/min  -AA     Barriers to Rehab  medically complex  -AA     Row Name 02/12/20 0814          Relationship/Environment    Lives With  alone  -AA     Row Name 02/12/20 0814          Resource/Environmental Concerns    Current Living Arrangements  home/apartment/condo  -AA     Row Name 02/12/20 0814          Home Main Entrance    Number of Stairs, Main Entrance  four  -AA     Stair Railings, Main Entrance  railing on left side (ascending)  -AA     Row Name 02/12/20 0814          Stairs Within Home, Primary    Number of Stairs, Within Home, Primary  none  -AA     Row Name 02/12/20 0814          Cognitive Assessment/Intervention- PT/OT    Orientation Status (Cognition)  oriented x 4  -AA     Row Name 02/12/20 0814          Safety Issues, Functional Mobility    Safety Issues Affecting Function (Mobility)  insight into deficits/self awareness;awareness of need for assistance;problem solving;safety precaution awareness;safety precautions follow-through/compliance;sequencing abilities  -AA     Impairments Affecting Function (Mobility)  balance;endurance/activity tolerance;strength;shortness of breath  -AA       User Key  (r) = Recorded By, (t) = Taken By, (c) = Cosigned By    Initials Name Provider Type    Mehnaz Chowdhury, PT Physical Therapist        Mobility     Row Name 02/12/20 0814          Bed Mobility Assessment/Treatment    Bed Mobility Assessment/Treatment  rolling right;rolling left;scooting/bridging;supine-sit;sit-supine  -AA     Rolling Left [place] (Bed Mobility)  minimum assist (75% patient effort);verbal cues  -AA      Rolling Right [place] (Bed Mobility)  minimum assist (75% patient effort);verbal cues  -AA     Scooting/Bridging [place] (Bed Mobility)  minimum assist (75% patient effort);verbal cues  -AA     Supine-Sit [place] (Bed Mobility)  minimum assist (75% patient effort);verbal cues  -AA     Sit-Supine [place] (Bed Mobility)  minimum assist (75% patient effort);verbal cues;nonverbal cues (demo/gesture)  -AA     Assistive Device (Bed Mobility)  bed rails;head of bed elevated;draw sheet  -AA     Comment (Bed Mobility)  increased time with cues for PLB  -AA     Row Name 02/12/20 0814          Transfer Assessment/Treatment    Comment (Transfers)  VC for hand placement, posture, and proper breathing.    -AA     Row Name 02/12/20 0814          Sit-Stand Transfer    Sit-Stand [place] (Transfers)  contact guard;verbal cues  -AA     Assistive Device (Sit-Stand Transfers)  walker, front-wheeled  -AA     Row Name 02/12/20 0814          Gait/Stairs Assessment/Training    Gait/Stairs Assessment/Training  gait/ambulation independence;gait/ambulation assistive device;distance ambulated;gait pattern;gait deviations  -AA     [place] Level (Gait)  minimum assist (75% patient effort);verbal cues  -AA     Assistive Device (Gait)  walker, front-wheeled  -AA     Distance in Feet (Gait)  3  -AA     Pattern (Gait)  step-to  -AA     Deviations/Abnormal Patterns (Gait)  bilateral deviations;ericka decreased;steppage;stride length decreased  -AA     Bilateral Gait Deviations  forward flexed posture  -AA     Comment (Gait/Stairs)  Pt ambulated with RW and min A with slow stepping and increased forward flexed posture for respiratory musculature.  Pt sat >88% with mod SOB post 3 feet on 4L oxygen  -AA       User Key  (r) = Recorded By, (t) = Taken By, (c) = Cosigned By    Initials Name Provider Type    Mehnaz Chowdhury PT Physical Therapist        Obj/Interventions     Row Name 02/12/20 0814          General ROM     GENERAL ROM COMMENTS  BLE WFL  -AA     Row Name 02/12/20 0814          MMT (Manual Muscle Testing)    General MMT Comments  BLE grossly assessed 3+/5  -AA     Row Name 02/12/20 0814          Static Sitting Balance    Level of [place] (Unsupported Sitting, Static Balance)  independent  -AA     Sitting Position (Unsupported Sitting, Static Balance)  sitting on edge of bed  -AA     Time Able to Maintain Position (Unsupported Sitting, Static Balance)  more than 5 minutes  -AA     Row Name 02/12/20 0814          Static Standing Balance    Level of [place] (Supported Standing, Static Balance)  contact guard assist  -AA     Time Able to Maintain Position (Supported Standing, Static Balance)  1 to 2 minutes  -AA     Assistive Device Utilized (Supported Standing, Static Balance)  walker, rolling  -AA     Row Name 02/12/20 0814          Dynamic Standing Balance    Level of [place], Reaches Outside Midline (Standing, Dynamic Balance)  minimal assist, 75% patient effort  -AA     Time Able to Maintain Position, Reaches Outside Midline (Standing, Dynamic Balance)  15 to 30 seconds  -AA     Assistive Device Utilized (Supported Standing, Dynamic Balance)  walker, rolling  -AA     Row Name 02/12/20 0814          Light Touch Sensation Assessment    Left Lower Extremity: Light Touch Sensation Assessment  mild impairment, 75% or more correct responses  -AA     Right Lower Extremity: Light Touch Sensation Assessment  mild impairment, 75% or more correct responses  -AA       User Key  (r) = Recorded By, (t) = Taken By, (c) = Cosigned By    Initials Name Provider Type    Mehnaz Chowdhury L, PT Physical Therapist        Goals/Plan     Row Name 02/12/20 0814          Bed Mobility Goal 1 (PT)    Activity/Assistive Device (Bed Mobility Goal 1, PT)  bed mobility activities, all  -AA     [place] Level/Cues Needed (Bed Mobility Goal 1, PT)  conditional independence  -AA     Time Frame (Bed Mobility Goal 1, PT)  10 days  -AA      Row Name 02/12/20 0814          Transfer Goal 1 (PT)    Activity/Assistive Device (Transfer Goal 1, PT)  sit-to-stand/stand-to-sit;bed-to-chair/chair-to-bed  -AA     [place] Level/Cues Needed (Transfer Goal 1, PT)  conditional independence  -AA     Time Frame (Transfer Goal 1, PT)  10 days  -AA     Row Name 02/12/20 0814          Gait Training Goal 1 (PT)    Activity/Assistive Device (Gait Training Goal 1, PT)  gait (walking locomotion);assistive device use  -AA     [place] Level (Gait Training Goal 1, PT)  supervision required  -AA     Distance (Gait Goal 1, PT)  50'  -AA     Time Frame (Gait Training Goal 1, PT)  10 days  -AA     Row Name 02/12/20 0814          Stairs Goal 1 (PT)    Activity/Assistive Device (Stairs Goal 1, PT)  stairs, all skills  -AA     [place] Level/Cues Needed (Stairs Goal 1, PT)  contact guard assist  -AA     Number of Stairs (Stairs Goal 1, PT)  4  -AA     Time Frame (Stairs Goal 1, PT)  10 days  -AA       User Key  (r) = Recorded By, (t) = Taken By, (c) = Cosigned By    Initials Name Provider Type    AA Mehnaz Lopez, PT Physical Therapist        Clinical Impression     Row Name 02/12/20 0814          Pain Assessment    Additional Documentation  Pain Scale: Numbers Pre/Post-Treatment (Group)  -AA     Row Name 02/12/20 0814          Pain Scale: Numbers Pre/Post-Treatment    Pain Scale: Numbers, Pretreatment  2/10  -AA     Pain Scale: Numbers, Post-Treatment  2/10  -AA     Pain Location - Orientation  generalized  -AA     Pain Location  abdomen  -AA     Pain Intervention(s)  Repositioned;Ambulation/increased activity  -AA     Row Name 02/12/20 0814          Plan of Care Review    Plan of Care Reviewed With  patient;daughter  -AA     Progress  improving  -AA     Row Name 02/12/20 0814          Physical Therapy Clinical Impression    Criteria for Skilled Interventions Met (PT Clinical Impression)  yes;treatment indicated  -AA     Rehab Potential (PT Clinical Summary)  fair,  will monitor progress closely  -AA     Predicted Duration of Therapy (PT)  10 days  -AA     Row Name 02/12/20 0814          Vital Signs    Pre Systolic BP Rehab  179  -AA     Pre Treatment Diastolic BP  95  -AA     Pre SpO2 (%)  94  -AA     O2 Delivery Pre Treatment  supplemental O2  -AA     Intra SpO2 (%)  88  -AA     O2 Delivery Intra Treatment  supplemental O2  -AA     Post SpO2 (%)  94  -AA     O2 Delivery Post Treatment  supplemental O2  -AA     Pre Patient Position  Side Lying  -AA     Intra Patient Position  Standing  -AA     Post Patient Position  Side Lying  -AA     Row Name 02/12/20 0814          Positioning and Restraints    Pre-Treatment Position  in bed  -AA     Post Treatment Position  bed  -AA     In Bed  notified nsg;side lying right;call light within reach;encouraged to call for assist;exit alarm on;with family/caregiver;pillow between legs  -AA       User Key  (r) = Recorded By, (t) = Taken By, (c) = Cosigned By    Initials Name Provider Type    Mehnaz Chowdhury, PT Physical Therapist        Outcome Measures     Row Name 02/12/20 0814          How much help from another person do you currently need...    Turning from your back to your side while in flat bed without using bedrails?  3  -AA     Moving from lying on back to sitting on the side of a flat bed without bedrails?  3  -AA     Moving to and from a bed to a chair (including a wheelchair)?  3  -AA     Standing up from a chair using your arms (e.g., wheelchair, bedside chair)?  3  -AA     Climbing 3-5 steps with a railing?  2  -AA     To walk in hospital room?  2  -AA     AM-PAC 6 Clicks Score (PT)  16  -AA     Row Name 02/12/20 0814          Functional Assessment    Outcome Measure Options  AM-PAC 6 Clicks Basic Mobility (PT)  -       User Key  (r) = Recorded By, (t) = Taken By, (c) = Cosigned By    Initials Name Provider Type    Mehnaz Chowdhury, PT Physical Therapist          PT Recommendation and Plan  Planned Therapy Interventions (PT  Eval): balance training, bed mobility training, gait training, home exercise program, patient/family education, stair training, strengthening, transfer training  Outcome Summary/Treatment Plan (PT)  Anticipated Equipment Needs at Discharge (PT): four wheeled walker  Anticipated Discharge Disposition (PT): skilled nursing facility  Plan of Care Reviewed With: patient, daughter  Progress: improving  Outcome Summary: PT eval complete.  Pt presents with weakness, reduced mobility, and increased SOB warranting skilled IPPT services.  Pt perform bed mobility with min A, transfers with RW and CGA, and ambulation 3 feet min A with RW.  All activity on 4L with sat drop to 88% and quickly improve to 90% with rest.  Recommend DC to SNF for rehab, family and pt request home with home health.  If pt returns home, she will need a rollator upon DC     Time Calculation:   PT Charges     Row Name 02/12/20 0814             Time Calculation    Start Time  0814  -AA      PT Received On  02/12/20  -AA      PT Goal Re-Cert Due Date  02/22/20  -AA        User Key  (r) = Recorded By, (t) = Taken By, (c) = Cosigned By    Initials Name Provider Type    AA Mehnaz Lopez, PT Physical Therapist        Therapy Charges for Today     Code Description Service Date Service Provider Modifiers Qty    [id number] HC PT EVAL MOD COMPLEXITY 4 2/12/2020 Mehnaz Lopez PT GP 1          PT G-Codes  Outcome Measure Options: AM-PAC 6 Clicks Basic Mobility (PT)  AM-PAC 6 Clicks Score (PT): 16  AM-PAC 6 Clicks Score (OT): 15    April KEITH Lopez PT  2/12/2020         Electronically signed by Mehnaz Lopez PT at 02/12/20 0995

## 2020-02-13 NOTE — PROGRESS NOTES
[facility] Medicine Services  PROGRESS NOTE    Patient Name: Sheila Gipson  : 1950  MRN: [id number]    Date of Admission: 2020  Primary Care Physician: Tu Lyons MD    Subjective   Subjective     CC:  Follow up for influenza infection and COPD    HPI:  No acute events overnight. Sat up to chair this morning without issues. Dyspnea slowly improving. Denies cough.    Review of Systems  Gen- No fevers, chills  CV- No chest pain, palpitations  GI- No N/V/D, abd pain     Objective   Objective     Vital Signs:   Temp:  [97.2 °F (36.2 °C)-99.8 °F (37.7 °C)] 98.3 °F (36.8 °C)  Heart Rate:  [54-92] 76  Resp:  [12-20] 16  BP: (142-164)/(81-99) 142/99        Physical Exam:  Constitutional: No acute distress, awake, alert  HENT: NCAT, mucous membranes moist  Respiratory: still having minimal wheezing, but improved from previously, respiratory effort normal on nasal canula   Cardiovascular: RRR, no murmurs, no lower extremity edema   Gastrointestinal: Positive bowel sounds, soft, nontender, nondistended  Psychiatric: Appropriate affect, cooperative  Neurologic: Oriented x 3, Cranial Nerves grossly intact to confrontation, speech clear  Skin: No rashes on exposed skin    Results Reviewed:  Results from last 7 days   Lab Units 02/10/20  0358 20  1619 20  1533   WBC 10*3/mm3 13.71* 12.41*  --    HEMOGLOBIN g/dL 15.4 15.5  --    HEMATOCRIT % 47.7* 47.9*  --    PLATELETS 10*3/mm3 169 183  --    INR   --   --  1.06     Results from last 7 days   Lab Units 02/10/20  0358 20  1533   SODIUM mmol/L 140 144   POTASSIUM mmol/L 4.1 4.3   CHLORIDE mmol/L 98 100   CO2 mmol/L 30.0* 32.0*   BUN mg/dL 42* 44*   CREATININE mg/dL 0.74 0.86   GLUCOSE mg/dL 110* 116*   CALCIUM mg/dL 8.7 9.1   ALT (SGPT) U/L  --  29   AST (SGOT) U/L  --  29   PROBNP pg/mL  --  1,363.0*     Estimated Creatinine Clearance: 54.3 mL/min (by C-G formula based on SCr of 0.74 mg/dL).    Microbiology  Results Abnormal     Procedure Component Value - Date/Time    Blood Culture - Blood, Arm, Left [[id number]] Collected:  02/09/20 1533    Lab Status:  Preliminary result Specimen:  Blood from Arm, Left Updated:  02/12/20 1631     Blood Culture No growth at 3 days    Blood Culture - Blood, Arm, Right [[id number]] Collected:  02/09/20 1542    Lab Status:  Preliminary result Specimen:  Blood from Arm, Right Updated:  02/12/20 1631     Blood Culture No growth at 3 days          Imaging Results (Last 24 Hours)     ** No results found for the last 24 hours. **          Results for orders placed during the hospital encounter of 02/09/20   Adult Transthoracic Echo Complete W/ Cont if Necessary Per Protocol    Narrative · Estimated EF = 55%.  · The cardiac valves are anatomically and functionally normal.          I have reviewed the medications:  Scheduled Meds:  arformoterol 15 mcg Nebulization BID - RT   budesonide 0.5 mg Nebulization BID - RT   cefTRIAXone 1 g Intravenous Q24H   cetirizine 10 mg Oral Daily   heparin (porcine) 5,000 Units Subcutaneous Q8H   ipratropium-albuterol 3 mL Nebulization 4x Daily - RT   methylPREDNISolone sodium succinate 60 mg Intravenous Q12H   nicotine 1 patch Transdermal Q24H   pharmacy consult - MTM  Does not apply Daily   polyethylene glycol 17 g Oral Daily   propranolol 10 mg Oral BID   sodium chloride 10 mL Intravenous Q12H     Continuous Infusions:  sodium chloride 125 mL/hr Last Rate: 125 mL/hr (02/13/20 0431)     PRN Meds:.•  acetaminophen **OR** acetaminophen **OR** acetaminophen  •  bisacodyl  •  docusate sodium  •  famotidine  •  labetalol  •  melatonin  •  ondansetron **OR** ondansetron  •  simethicone  •  sodium chloride    Assessment/Plan   Assessment & Plan     Active Hospital Problems    Diagnosis  POA   • **Acute respiratory failure with hypoxia (CMS/HCC) [J96.01]  Yes   • Tobacco use [Z72.0]  Yes   • COPD with acute exacerbation (CMS/HCC) [J44.1]  Yes   • Influenza [J11.1]  Yes    • Benign essential tremor [G25.0]  Yes   • Unintentional weight loss [R63.4]  Yes   • Essential hypertension [I10]  Yes   • Hypertensive urgency [I16.0]  Yes   • Debility [R53.81]  Yes      Resolved Hospital Problems   No resolved problems to display.        Brief Hospital Course to date:  Sheila Gipson is a 69 y.o. female with PMH significant for HTN, COPD, tobacco use who was admitted on 2/9 for acute on chronic hypoxic respiratory failure on 3L nasal canula at home, slowly improving      Acute on chronic  hypoxic respiratory failure   COPD  CT PE negative for PE  -Continue Brovana, Pulmicort and duo nebs - will continue these upon discharge  -continue solumedrol to 60 mg IV BID   -continue ceftriaxone/azithromycin x 7 days total - completed tamiflu      Sinus bradycardia with sinus arrhythmia - stable   -EKG unchanged from previous in 2010   -Echo with EF wnl no diastolic dysfunction mentioned      Tremor - stable - continue propanolol     DVT Prophylaxis: Heparin     Disposition: I expect the patient to be discharged pending placement.    CODE STATUS:   Code Status and Medical Interventions:   Ordered at: 02/09/20 1526     Code Status:    No CPR     Medical Interventions (Level of Support Prior to Arrest):    Full     Comments:    ok with intubation no CPR         Electronically signed by Ann-Marie Elliott MD, 02/13/20, 12:47 PM.

## 2020-02-13 NOTE — PLAN OF CARE
Problem: Patient Care Overview  Goal: Plan of Care Review  Outcome: Ongoing (interventions implemented as appropriate)  Flowsheets (Taken 2/13/2020 1619)  Progress: improving  Outcome Summary: Pt able to perform STS t/f with CGA and bed to chair t/f with min A.  Continues to be limited by decreased activity tolerance, SOA, and weakness.  Continue per IPPT POC.

## 2020-02-13 NOTE — THERAPY TREATMENT NOTE
Patient Name: Sheila Gipson  : 1950    MRN: [id number]                              Today's Date: 2020       Admit Date: 2020    Visit Dx:     ICD-10-CM ICD-9-CM   1. Impaired mobility and ADLs Z74.09 799.89     Patient Active Problem List   Diagnosis   • Acute respiratory failure with hypoxia (CMS/HCC)   • Tobacco use   • COPD with acute exacerbation (CMS/[facility])   • Influenza   • Benign essential tremor   • Unintentional weight loss   • Essential hypertension   • Hypertensive urgency   • Debility     Past Medical History:   Diagnosis Date   • COPD (chronic obstructive pulmonary disease) (CMS/HCC)    • Hypertension      History reviewed. No pertinent surgical history.  General Information     Row Name 20 1617          PT Evaluation Time/Intention    Document Type  therapy note (daily note)  -ES     Mode of Treatment  individual therapy;physical therapy  -ES     Row Name 20          General Information    Patient Profile Reviewed?  yes  -ES     Existing Precautions/Restrictions  fall;oxygen therapy device and L/min  -ES     Row Name 20          Safety Issues, Functional Mobility    Safety Issues Affecting Function (Mobility)  safety precaution awareness;safety precautions follow-through/compliance;insight into deficits/self awareness;awareness of need for assistance  -ES     Impairments Affecting Function (Mobility)  balance;endurance/activity tolerance;shortness of breath;strength  -ES       User Key  (r) = Recorded By, (t) = Taken By, (c) = Cosigned By    Initials Name Provider Type    ES Kimberly Bullock, PT Physical Therapist        Mobility     Row Name 20          Bed Mobility Assessment/Treatment    Bed Mobility Assessment/Treatment  sit-supine  -ES     Scooting/Bridging [place] (Bed Mobility)  supervision;verbal cues  -ES     Sit-Supine [place] (Bed Mobility)  contact guard;verbal cues  -ES     Assistive Device (Bed Mobility)  bed rails;head of  bed elevated  -ES     Comment (Bed Mobility)  cues for sequencing  -ES     Row Name 02/13/20 1617          Bed-Chair Transfer    Bed-Chair [place] (Transfers)  minimum assist (75% patient effort);verbal cues  -ES     Assistive Device (Bed-Chair Transfers)  -- 1 HHA  -ES     Row Name 02/13/20 1617          Sit-Stand Transfer    Sit-Stand [place] (Transfers)  contact guard  -ES     Assistive Device (Sit-Stand Transfers)  -- no AD  -ES     Row Name 02/13/20 1617          Gait/Stairs Assessment/Training    [place] Level (Gait)  not tested  -ES     Comment (Gait/Stairs)  Deferred, pt already had walked to bathroom, requesting to get back to bed  -ES       User Key  (r) = Recorded By, (t) = Taken By, (c) = Cosigned By    Initials Name Provider Type    Kimberly Ocampo, PT Physical Therapist        Obj/Interventions     Row Name 02/13/20 1619          Therapeutic Exercise    Lower Extremity (Therapeutic Exercise)  gluteal sets;quad sets, bilateral  -ES     Lower Extremity Range of Motion (Therapeutic Exercise)  hip abduction/adduction, bilateral;ankle dorsiflexion/plantar flexion, bilateral  -ES     Exercise Type (Therapeutic Exercise)  AROM (active range of motion);isometric contraction, static  -ES     Position (Therapeutic Exercise)  supine  -ES     Sets/Reps (Therapeutic Exercise)  1/10  -ES     Comment (Therapeutic Exercise)  Fatigues quickly with exercises, cues for technique  -ES       User Key  (r) = Recorded By, (t) = Taken By, (c) = Cosigned By    Initials Name Provider Type    Kimberly Ocampo, PT Physical Therapist        Goals/Plan    No documentation.       Clinical Impression     Row Name 02/13/20 1619          Pain Scale: Numbers Pre/Post-Treatment    Pain Scale: Numbers, Pretreatment  0/10 - no pain  -ES     Pain Scale: Numbers, Post-Treatment  0/10 - no pain  -ES     Row Name 02/13/20 1619          Plan of Care Review    Plan of Care Reviewed With  patient  -ES     Progress  improving   -ES     Outcome Summary  Pt able to perform STS t/f with CGA and bed to chair t/f with min A.  Continues to be limited by decreased activity tolerance, SOA, and weakness.  Continue per IPPT POC.  -ES     Row Name 02/13/20 1619          Vital Signs    Pre Systolic BP Rehab  160  -ES     Pre Treatment Diastolic BP  89  -ES     Pretreatment Heart Rate (beats/min)  55  -ES     Posttreatment Heart Rate (beats/min)  60  -ES     Pre SpO2 (%)  93  -ES     O2 Delivery Pre Treatment  supplemental O2  -ES     Intra SpO2 (%)  80  -ES     O2 Delivery Intra Treatment  supplemental O2  -ES     Post SpO2 (%)  98  -ES     O2 Delivery Post Treatment  supplemental O2  -ES     Pre Patient Position  Sitting  -ES     Intra Patient Position  Standing  -ES     Post Patient Position  Supine  -ES     Row Name 02/13/20 1619          Positioning and Restraints    Pre-Treatment Position  sitting in chair/recliner  -ES     Post Treatment Position  bed  -ES     In Bed  notified nsg;supine;call light within reach;encouraged to call for assist;exit alarm on;with family/caregiver  -ES       User Key  (r) = Recorded By, (t) = Taken By, (c) = Cosigned By    Initials Name Provider Type    ES Kimberly Bullock, PT Physical Therapist        Outcome Measures     Row Name 02/13/20 1621          How much help from another person do you currently need...    Turning from your back to your side while in flat bed without using bedrails?  4  -ES     Moving from lying on back to sitting on the side of a flat bed without bedrails?  3  -ES     Moving to and from a bed to a chair (including a wheelchair)?  3  -ES     Standing up from a chair using your arms (e.g., wheelchair, bedside chair)?  3  -ES     Climbing 3-5 steps with a railing?  2  -ES     To walk in hospital room?  3  -ES     AM-PAC 6 Clicks Score (PT)  18  -ES     Row Name 02/13/20 1621          Functional Assessment    Outcome Measure Options  AM-PAC 6 Clicks Basic Mobility (PT)  -ES       User Key  (r) =  Recorded By, (t) = Taken By, (c) = Cosigned By    Initials Name Provider Type    Kimberly Ocampo, PT Physical Therapist          PT Recommendation and Plan     Plan of Care Reviewed With: patient  Progress: improving  Outcome Summary: Pt able to perform STS t/f with CGA and bed to chair t/f with min A.  Continues to be limited by decreased activity tolerance, SOA, and weakness.  Continue per IPPT POC.     Time Calculation:   PT Charges     Row Name 02/13/20 1622             Time Calculation    Start Time  1513  -ES      PT Received On  02/13/20  -ES      PT Goal Re-Cert Due Date  02/22/20  -ES         Timed Charges    55966 - PT Therapeutic Exercise Minutes  8  -ES      55070 - PT Therapeutic Activity Minutes  5  -ES        User Key  (r) = Recorded By, (t) = Taken By, (c) = Cosigned By    Initials Name Provider Type    Kimberly Ocampo, PT Physical Therapist        Therapy Charges for Today     Code Description Service Date Service Provider Modifiers Qty    [id number] HC PT THER PROC EA 15 MIN 2/13/2020 Kimberly Bullock PT GP 1          PT G-Codes  Outcome Measure Options: AM-PAC 6 Clicks Basic Mobility (PT)  AM-PAC 6 Clicks Score (PT): 18  AM-PAC 6 Clicks Score (OT): 15    Kimberly Bullock PT  2/13/2020

## 2020-02-13 NOTE — NURSING NOTE
WOC nurse follow up for bridge of nose from bipap and bilateral heels.  Patient is no longer using the Bipap during the day, wound on nasal bridge healing, patient states it is not painful, it looks like friction or skin tear vs pressure.  Bilateral heels without pressure injuries, just dry.  Recommend thick layer of z guard on the heels to moisturize.  Spoke with BRIJESH Harmon patient doing better, coccyx red and blanchable.  No further WOC nurse need, reconsult if needed.  Recommend a podiatrist.

## 2020-02-13 NOTE — CONSULTS
NN spoke with pt at BS.  Pt alert and able to answer questions appropriately.  Pt sitting on side of bed eating, O2 on 3L.  No signs of distress noted.  COPD education completed in the form of handouts and verbal instruction.  Pt states that she is planning to go to rehab tomorrow.  COPD action plan reviewed.  No other questions or concerns voiced at this time.  NN will continue to follow.

## 2020-02-14 LAB
BACTERIA SPEC AEROBE CULT: NORMAL
BACTERIA SPEC AEROBE CULT: NORMAL
BASOPHILS # BLD AUTO: 0.01 10*3/MM3 (ref 0–0.2)
BASOPHILS NFR BLD AUTO: 0.1 % (ref 0–1.5)
DEPRECATED RDW RBC AUTO: 44.1 FL (ref 37–54)
EOSINOPHIL # BLD AUTO: 0.01 10*3/MM3 (ref 0–0.4)
EOSINOPHIL NFR BLD AUTO: 0.1 % (ref 0.3–6.2)
ERYTHROCYTE [DISTWIDTH] IN BLOOD BY AUTOMATED COUNT: 12.3 % (ref 12.3–15.4)
HCT VFR BLD AUTO: 43.8 % (ref 34–46.6)
HGB BLD-MCNC: 14.1 G/DL (ref 12–15.9)
IMM GRANULOCYTES # BLD AUTO: 0.1 10*3/MM3 (ref 0–0.05)
IMM GRANULOCYTES NFR BLD AUTO: 0.9 % (ref 0–0.5)
LYMPHOCYTES # BLD AUTO: 1.83 10*3/MM3 (ref 0.7–3.1)
LYMPHOCYTES NFR BLD AUTO: 16.2 % (ref 19.6–45.3)
MCH RBC QN AUTO: 30.9 PG (ref 26.6–33)
MCHC RBC AUTO-ENTMCNC: 32.2 G/DL (ref 31.5–35.7)
MCV RBC AUTO: 95.8 FL (ref 79–97)
MONOCYTES # BLD AUTO: 0.92 10*3/MM3 (ref 0.1–0.9)
MONOCYTES NFR BLD AUTO: 8.1 % (ref 5–12)
NEUTROPHILS # BLD AUTO: 8.46 10*3/MM3 (ref 1.7–7)
NEUTROPHILS NFR BLD AUTO: 74.6 % (ref 42.7–76)
NRBC BLD AUTO-RTO: 0 /100 WBC (ref 0–0.2)
PLATELET # BLD AUTO: 177 10*3/MM3 (ref 140–450)
PMV BLD AUTO: 11 FL (ref 6–12)
RBC # BLD AUTO: 4.57 10*6/MM3 (ref 3.77–5.28)
WBC NRBC COR # BLD: 11.33 10*3/MM3 (ref 3.4–10.8)

## 2020-02-14 PROCEDURE — 25010000002 METHYLPREDNISOLONE PER 125 MG: Performed by: INTERNAL MEDICINE

## 2020-02-14 PROCEDURE — 94799 UNLISTED PULMONARY SVC/PX: CPT

## 2020-02-14 PROCEDURE — 85025 COMPLETE CBC W/AUTO DIFF WBC: CPT | Performed by: INTERNAL MEDICINE

## 2020-02-14 PROCEDURE — 99232 SBSQ HOSP IP/OBS MODERATE 35: CPT | Performed by: INTERNAL MEDICINE

## 2020-02-14 PROCEDURE — 25010000002 HEPARIN (PORCINE) PER 1000 UNITS: Performed by: INTERNAL MEDICINE

## 2020-02-14 PROCEDURE — 25010000002 CEFTRIAXONE PER 250 MG: Performed by: INTERNAL MEDICINE

## 2020-02-14 RX ORDER — PREDNISONE 20 MG/1
40 TABLET ORAL
Status: DISCONTINUED | OUTPATIENT
Start: 2020-02-15 | End: 2020-02-17 | Stop reason: HOSPADM

## 2020-02-14 RX ADMIN — HEPARIN SODIUM 5000 UNITS: 5000 INJECTION, SOLUTION INTRAVENOUS; SUBCUTANEOUS at 06:12

## 2020-02-14 RX ADMIN — METHYLPREDNISOLONE SODIUM SUCCINATE 60 MG: 125 INJECTION, POWDER, FOR SOLUTION INTRAMUSCULAR; INTRAVENOUS at 06:12

## 2020-02-14 RX ADMIN — SODIUM CHLORIDE, PRESERVATIVE FREE 10 ML: 5 INJECTION INTRAVENOUS at 09:10

## 2020-02-14 RX ADMIN — HEPARIN SODIUM 5000 UNITS: 5000 INJECTION, SOLUTION INTRAVENOUS; SUBCUTANEOUS at 14:39

## 2020-02-14 RX ADMIN — POLYETHYLENE GLYCOL 3350 17 G: 17 POWDER, FOR SOLUTION ORAL at 09:10

## 2020-02-14 RX ADMIN — MELATONIN TAB 5 MG 5 MG: 5 TAB at 20:59

## 2020-02-14 RX ADMIN — NICOTINE 1 PATCH: 14 PATCH, EXTENDED RELEASE TRANSDERMAL at 09:10

## 2020-02-14 RX ADMIN — SIMETHICONE CHEW TAB 80 MG 80 MG: 80 TABLET ORAL at 22:41

## 2020-02-14 RX ADMIN — FAMOTIDINE 20 MG: 20 TABLET ORAL at 21:00

## 2020-02-14 RX ADMIN — HEPARIN SODIUM 5000 UNITS: 5000 INJECTION, SOLUTION INTRAVENOUS; SUBCUTANEOUS at 21:00

## 2020-02-14 RX ADMIN — CETIRIZINE HYDROCHLORIDE 10 MG: 10 TABLET, FILM COATED ORAL at 09:10

## 2020-02-14 RX ADMIN — IPRATROPIUM BROMIDE AND ALBUTEROL SULFATE 3 ML: 2.5; .5 SOLUTION RESPIRATORY (INHALATION) at 15:59

## 2020-02-14 RX ADMIN — ARFORMOTEROL TARTRATE 15 MCG: 15 SOLUTION RESPIRATORY (INHALATION) at 07:51

## 2020-02-14 RX ADMIN — CEFTRIAXONE 1 G: 1 INJECTION, POWDER, FOR SOLUTION INTRAMUSCULAR; INTRAVENOUS at 16:41

## 2020-02-14 RX ADMIN — SODIUM CHLORIDE, PRESERVATIVE FREE 10 ML: 5 INJECTION INTRAVENOUS at 22:34

## 2020-02-14 RX ADMIN — IPRATROPIUM BROMIDE AND ALBUTEROL SULFATE 3 ML: 2.5; .5 SOLUTION RESPIRATORY (INHALATION) at 12:48

## 2020-02-14 RX ADMIN — BUDESONIDE 0.5 MG: 0.5 INHALANT RESPIRATORY (INHALATION) at 19:58

## 2020-02-14 RX ADMIN — PROPRANOLOL HYDROCHLORIDE 10 MG: 10 TABLET ORAL at 20:59

## 2020-02-14 RX ADMIN — IPRATROPIUM BROMIDE AND ALBUTEROL SULFATE 3 ML: 2.5; .5 SOLUTION RESPIRATORY (INHALATION) at 19:59

## 2020-02-14 RX ADMIN — IPRATROPIUM BROMIDE AND ALBUTEROL SULFATE 3 ML: 2.5; .5 SOLUTION RESPIRATORY (INHALATION) at 07:51

## 2020-02-14 RX ADMIN — BUDESONIDE 0.5 MG: 0.5 INHALANT RESPIRATORY (INHALATION) at 07:51

## 2020-02-14 RX ADMIN — ARFORMOTEROL TARTRATE 15 MCG: 15 SOLUTION RESPIRATORY (INHALATION) at 19:59

## 2020-02-14 RX ADMIN — PROPRANOLOL HYDROCHLORIDE 10 MG: 10 TABLET ORAL at 12:24

## 2020-02-14 NOTE — CONSULTS
NN spoke with pt at BS.  Pt alert and able to answer questions appropriately.  Pt states that she has no questions with COPD action plan and that she is going to rehab as soon as bed is available.  No other questions or concerns voiced at this time.  NN will continue to follow.

## 2020-02-14 NOTE — DISCHARGE PLACEMENT REQUEST
"Case management [phone]  Sheila Harrington (69 y.o. Female)     Date of Birth Social Security Number Address Home Phone MRN    1950  [address] [place] [id number] [phone] [id number]    Gn[employer] Marital Status          None        Admission Date Admission Type Admitting Provider Attending Provider Department, Room/Bed    20 Urgent Ann-Marie Elliott MD Anciro, Audree, MD [facility] 4H, S474/1    Discharge Date Discharge Disposition Discharge Destination                       Attending Provider:  Ann-Marie Elliott MD    Allergies:  No Known Allergies    Isolation:  None   Infection:  None   Code Status:  No CPR    Ht:  167.6 cm (66\")   Wt:  52.6 kg (116 lb)    Admission Cmt:  None   Principal Problem:  Acute respiratory failure with hypoxia (CMS/HCC) [J96.01]                 Active Insurance as of 2020     Primary Coverage     Payor Plan Insurance Group Employer/Plan Group    HUMANA MEDICARE REPLACEMENT HU[employer]NA MEDICARE REPLACEMENT [id number]     Payor Plan Address Payor Plan Phone Number Payor Plan Fax Number Effective Dates    [address] [address] [phone]  2020 - None Entered    [facility] [address]       Subscriber Name Subscriber Birth Date Member ID       SHEILA HARRINGTON 1950 [id number]                 Emergency Contacts      (Rel.) Home Phone Work Phone Mobile Phone    Cande Patton (Daughter) -- -- [phone]    Bekah Frey (Sister) -- -- [phone]    Aliyah Trevizo (Daughter) -- -- [phone]    Israel Mccarthy (Daughter) -- -- [phone]               Physician Progress Notes (most recent note)      Ann-Marie Elliott MD at 20 [address] [place] [address] Medicine Services  PROGRESS NOTE    Patient Name: Sheila Harrington  : 1950  MRN: [id number]    Date of Admission: 2020  Primary Care Physician: Tu Lyons MD    Subjective   Subjective     CC:  Follow up for influenza infection " and COPD    HPI:  No acute events overnight. Sat up to chair this morning without issues. Dyspnea slowly improving. Denies cough.    Review of Systems  Gen- No fevers, chills  CV- No chest pain, palpitations  GI- No N/V/D, abd pain     Objective   Objective     Vital Signs:   Temp:  [97.2 °F (36.2 °C)-99.8 °F (37.7 °C)] 98.3 °F (36.8 °C)  Heart Rate:  [54-92] 76  Resp:  [12-20] 16  BP: (142-164)/(81-99) 142/99        Physical Exam:  Constitutional: No acute distress, awake, alert  HENT: NCAT, mucous membranes moist  Respiratory: still having minimal wheezing, but improved from previously, respiratory effort normal on nasal canula   Cardiovascular: RRR, no murmurs, no lower extremity edema   Gastrointestinal: Positive bowel sounds, soft, nontender, nondistended  Psychiatric: Appropriate affect, cooperative  Neurologic: Oriented x 3, Cranial Nerves grossly intact to confrontation, speech clear  Skin: No rashes on exposed skin    Results Reviewed:  Results from last 7 days   Lab Units 02/10/20  0358 02/09/20  1619 02/09/20  1533   WBC 10*3/mm3 13.71* 12.41*  --    HEMOGLOBIN g/dL 15.4 15.5  --    HEMATOCRIT % 47.7* 47.9*  --    PLATELETS 10*3/mm3 169 183  --    INR   --   --  1.06     Results from last 7 days   Lab Units 02/10/20  0358 02/09/20  1533   SODIUM mmol/L 140 144   POTASSIUM mmol/L 4.1 4.3   CHLORIDE mmol/L 98 100   CO2 mmol/L 30.0* 32.0*   BUN mg/dL 42* 44*   CREATININE mg/dL 0.74 0.86   GLUCOSE mg/dL 110* 116*   CALCIUM mg/dL 8.7 9.1   ALT (SGPT) U/L  --  29   AST (SGOT) U/L  --  29   PROBNP pg/mL  --  1,363.0*     Estimated Creatinine Clearance: 54.3 mL/min (by C-G formula based on SCr of 0.74 mg/dL).    Microbiology Results Abnormal     Procedure Component Value - Date/Time    Blood Culture - Blood, Arm, Left [[id number]] Collected:  02/09/20 1533    Lab Status:  Preliminary result Specimen:  Blood from Arm, Left Updated:  02/12/20 1631     Blood Culture No growth at 3 days    Blood Culture - Blood, Arm,  Right [[id number]] Collected:  02/09/20 1542    Lab Status:  Preliminary result Specimen:  Blood from Arm, Right Updated:  02/12/20 1631     Blood Culture No growth at 3 days          Imaging Results (Last 24 Hours)     ** No results found for the last 24 hours. **          Results for orders placed during the hospital encounter of 02/09/20   Adult Transthoracic Echo Complete W/ Cont if Necessary Per Protocol    Narrative · Estimated EF = 55%.  · The cardiac valves are anatomically and functionally normal.          I have reviewed the medications:  Scheduled Meds:  arformoterol 15 mcg Nebulization BID - RT   budesonide 0.5 mg Nebulization BID - RT   cefTRIAXone 1 g Intravenous Q24H   cetirizine 10 mg Oral Daily   heparin (porcine) 5,000 Units Subcutaneous Q8H   ipratropium-albuterol 3 mL Nebulization 4x Daily - RT   methylPREDNISolone sodium succinate 60 mg Intravenous Q12H   nicotine 1 patch Transdermal Q24H   pharmacy consult - MTM  Does not apply Daily   polyethylene glycol 17 g Oral Daily   propranolol 10 mg Oral BID   sodium chloride 10 mL Intravenous Q12H     Continuous Infusions:  sodium chloride 125 mL/hr Last Rate: 125 mL/hr (02/13/20 0431)     PRN Meds:.•  acetaminophen **OR** acetaminophen **OR** acetaminophen  •  bisacodyl  •  docusate sodium  •  famotidine  •  labetalol  •  melatonin  •  ondansetron **OR** ondansetron  •  simethicone  •  sodium chloride    Assessment/Plan   Assessment & Plan     Active Hospital Problems    Diagnosis  POA   • **Acute respiratory failure with hypoxia (CMS/HCC) [J96.01]  Yes   • Tobacco use [Z72.0]  Yes   • COPD with acute exacerbation (CMS/HCC) [J44.1]  Yes   • Influenza [J11.1]  Yes   • Benign essential tremor [G25.0]  Yes   • Unintentional weight loss [R63.4]  Yes   • Essential hypertension [I10]  Yes   • Hypertensive urgency [I16.0]  Yes   • Debility [R53.81]  Yes      Resolved Hospital Problems   No resolved problems to display.        Brief Hospital Course to  date:  Sheila Gipson is a 69 y.o. female with PMH significant for HTN, COPD, tobacco use who was admitted on  for acute on chronic hypoxic respiratory failure on 3L nasal canula at home, slowly improving      Acute on chronic  hypoxic respiratory failure   COPD  CT PE negative for PE  -Continue Brovana, Pulmicort and duo nebs - will continue these upon discharge  -continue solumedrol to 60 mg IV BID   -continue ceftriaxone/azithromycin x 7 days total - completed tamiflu      Sinus bradycardia with sinus arrhythmia - stable   -EKG unchanged from previous in    -Echo with EF wnl no diastolic dysfunction mentioned      Tremor - stable - continue propanolol     DVT Prophylaxis: Heparin     Disposition: I expect the patient to be discharged pending placement.    CODE STATUS:   Code Status and Medical Interventions:   Ordered at: 20 1526     Code Status:    No CPR     Medical Interventions (Level of Support Prior to Arrest):    Full     Comments:    ok with intubation no CPR         Electronically signed by Ann-Marie Elliott MD, 20, 12:47 PM.        Electronically signed by Ann-Marie Elliott MD at 20 1252          Physical Therapy Notes (most recent note)      Kimberly Bullock, PT at 20 1513  Version 1 of 1         Patient Name: Sheila Gipson  : 1950    MRN: [id number]                              Today's Date: 2020       Admit Date: 2020    Visit Dx:     ICD-10-CM ICD-9-CM   1. Impaired mobility and ADLs Z74.09 799.89     Patient Active Problem List   Diagnosis   • Acute respiratory failure with hypoxia (CMS/HCC)   • Tobacco use   • COPD with acute exacerbation (CMS/HCC)   • Influenza   • Benign essential tremor   • Unintentional weight loss   • Essential hypertension   • Hypertensive urgency   • Debility     Past Medical History:   Diagnosis Date   • COPD (chronic obstructive pulmonary disease) (CMS/HCC)    • Hypertension      History reviewed. No pertinent surgical  history.  General Information     Row Name 02/13/20 1617          PT Evaluation Time/Intention    Document Type  therapy note (daily note)  -ES     Mode of Treatment  individual therapy;physical therapy  -ES     Row Name 02/13/20 1617          General Information    Patient Profile Reviewed?  yes  -ES     Existing Precautions/Restrictions  fall;oxygen therapy device and L/min  -ES     Row Name 02/13/20 1617          Safety Issues, Functional Mobility    Safety Issues Affecting Function (Mobility)  safety precaution awareness;safety precautions follow-through/compliance;insight into deficits/self awareness;awareness of need for assistance  -ES     Impairments Affecting Function (Mobility)  balance;endurance/activity tolerance;shortness of breath;strength  -ES       User Key  (r) = Recorded By, (t) = Taken By, (c) = Cosigned By    Initials Name Provider Type    ES Kimberly Bullock, PT Physical Therapist        Mobility     Row Name 02/13/20 1617          Bed Mobility Assessment/Treatment    Bed Mobility Assessment/Treatment  sit-supine  -ES     Scooting/Bridging Sebastian (Bed Mobility)  supervision;verbal cues  -ES     Sit-Supine Sebastian (Bed Mobility)  contact guard;verbal cues  -ES     Assistive Device (Bed Mobility)  bed rails;head of bed elevated  -ES     Comment (Bed Mobility)  cues for sequencing  -ES     Row Name 02/13/20 1617          Bed-Chair Transfer    Bed-Chair Sebastian (Transfers)  minimum assist (75% patient effort);verbal cues  -ES     Assistive Device (Bed-Chair Transfers)  -- 1 HHA  -ES     Row Name 02/13/20 1617          Sit-Stand Transfer    Sit-Stand Sebastian (Transfers)  contact guard  -ES     Assistive Device (Sit-Stand Transfers)  -- no AD  -ES     Row Name 02/13/20 1617          Gait/Stairs Assessment/Training    Sebastian Level (Gait)  not tested  -ES     Comment (Gait/Stairs)  Deferred, pt already had walked to bathroom, requesting to get back to bed  -ES       User Key   (r) = Recorded By, (t) = Taken By, (c) = Cosigned By    Initials Name Provider Type    Kimberly Ocampo PT Physical Therapist        Obj/Interventions     Row Name 02/13/20 1619          Therapeutic Exercise    Lower Extremity (Therapeutic Exercise)  gluteal sets;quad sets, bilateral  -ES     Lower Extremity Range of Motion (Therapeutic Exercise)  hip abduction/adduction, bilateral;ankle dorsiflexion/plantar flexion, bilateral  -ES     Exercise Type (Therapeutic Exercise)  AROM (active range of motion);isometric contraction, static  -ES     Position (Therapeutic Exercise)  supine  -ES     Sets/Reps (Therapeutic Exercise)  1/10  -ES     Comment (Therapeutic Exercise)  Fatigues quickly with exercises, cues for technique  -ES       User Key  (r) = Recorded By, (t) = Taken By, (c) = Cosigned By    Initials Name Provider Type    Kimberly Ocampo PT Physical Therapist        Goals/Plan    No documentation.       Clinical Impression     Row Name 02/13/20 1619          Pain Scale: Numbers Pre/Post-Treatment    Pain Scale: Numbers, Pretreatment  0/10 - no pain  -ES     Pain Scale: Numbers, Post-Treatment  0/10 - no pain  -ES     Row Name 02/13/20 1619          Plan of Care Review    Plan of Care Reviewed With  patient  -ES     Progress  improving  -ES     Outcome Summary  Pt able to perform STS t/f with CGA and bed to chair t/f with min A.  Continues to be limited by decreased activity tolerance, SOA, and weakness.  Continue per IPPT POC.  -ES     Row Name 02/13/20 1619          Vital Signs    Pre Systolic BP Rehab  160  -ES     Pre Treatment Diastolic BP  89  -ES     Pretreatment Heart Rate (beats/min)  55  -ES     Posttreatment Heart Rate (beats/min)  60  -ES     Pre SpO2 (%)  93  -ES     O2 Delivery Pre Treatment  supplemental O2  -ES     Intra SpO2 (%)  80  -ES     O2 Delivery Intra Treatment  supplemental O2  -ES     Post SpO2 (%)  98  -ES     O2 Delivery Post Treatment  supplemental O2  -ES     Pre Patient  Position  Sitting  -ES     Intra Patient Position  Standing  -ES     Post Patient Position  Supine  -ES     Row Name 02/13/20 1619          Positioning and Restraints    Pre-Treatment Position  sitting in chair/recliner  -ES     Post Treatment Position  bed  -ES     In Bed  notified nsg;supine;call light within reach;encouraged to call for assist;exit alarm on;with family/caregiver  -ES       User Key  (r) = Recorded By, (t) = Taken By, (c) = Cosigned By    Initials Name Provider Type    Kimberly Ocampo PT Physical Therapist        Outcome Measures     Row Name 02/13/20 1621          How much help from another person do you currently need...    Turning from your back to your side while in flat bed without using bedrails?  4  -ES     Moving from lying on back to sitting on the side of a flat bed without bedrails?  3  -ES     Moving to and from a bed to a chair (including a wheelchair)?  3  -ES     Standing up from a chair using your arms (e.g., wheelchair, bedside chair)?  3  -ES     Climbing 3-5 steps with a railing?  2  -ES     To walk in hospital room?  3  -ES     AM-PAC 6 Clicks Score (PT)  18  -ES     Row Name 02/13/20 1621          Functional Assessment    Outcome Measure Options  AM-PAC 6 Clicks Basic Mobility (PT)  -ES       User Key  (r) = Recorded By, (t) = Taken By, (c) = Cosigned By    Initials Name Provider Type    Kimberly Ocampo, PT Physical Therapist          PT Recommendation and Plan     Plan of Care Reviewed With: patient  Progress: improving  Outcome Summary: Pt able to perform STS t/f with CGA and bed to chair t/f with min A.  Continues to be limited by decreased activity tolerance, SOA, and weakness.  Continue per IPPT POC.     Time Calculation:   PT Charges     Row Name 02/13/20 1622             Time Calculation    Start Time  1513  -ES      PT Received On  02/13/20  -ES      PT Goal Re-Cert Due Date  02/22/20  -ES         Timed Charges    05836 - PT Therapeutic Exercise Minutes  8  -ES       85774 - PT Therapeutic Activity Minutes  5  -ES        User Key  (r) = Recorded By, (t) = Taken By, (c) = Cosigned By    Initials Name Provider Type    Kimberly Ocampo PT Physical Therapist        Therapy Charges for Today     Code Description Service Date Service Provider Modifiers Qty    [id number]  PT THER PROC EA 15 MIN 2/13/2020 Kimberly Bullock PT GP 1          PT G-Codes  Outcome Measure Options: AM-PAC 6 Clicks Basic Mobility (PT)  AM-PAC 6 Clicks Score (PT): 18  AM-PAC 6 Clicks Score (OT): 15    Kimberly Bullock PT  2/13/2020         Electronically signed by Kimberly Bullock PT at 02/13/20 8257

## 2020-02-14 NOTE — DISCHARGE PLACEMENT REQUEST
"Sheila Harrington (69 y.o. Female)     Date of Birth Social Security Number Address Home Phone MRN    1950  [facility] [place] [id number] [phone] [id number]    [employer] Marital Status          None        Admission Date Admission Type Admitting Provider Attending Provider Department, Room/Bed    2/9/20 Urgent Ann-Marie Elliott MD Anciro, Audree, MD [address], S474/1    Discharge Date Discharge Disposition Discharge Destination                       Attending Provider:  Ann-Marie Elliott MD    Allergies:  No Known Allergies    Isolation:  None   Infection:  None   Code Status:  No CPR    Ht:  167.6 cm (66\")   Wt:  52.6 kg (116 lb)    Admission Cmt:  None   Principal Problem:  Acute respiratory failure with hypoxia (CMS/HCC) [J96.01]                 Active Insurance as of 2/9/2020     Primary Coverage     Payor Plan Insurance Group Employer/Plan Group    HUMANA MEDICARE REPLACEMENT HUMANA MEDICARE REPLACEMENT [id number]     Payor Plan Address Payor Plan Phone Number Payor Plan Fax Number Effective Dates    [address] [address] [phone]  1/1/2020 - None Entered    [facility] [address]       Subscriber Name Subscriber Birth Date Member ID       SHEILA HARRINGTON 1950 [id number]                 Emergency Contacts      (Rel.) Home Phone Work Phone Mobile Phone    Cande Patton (Daughter) -- -- [phone]    Bekah Frey (Sister) -- -- [phone]    Aliyah Trevizo (Daughter) -- -- [phone]    Israel Mccarthy (Daughter) -- -- [phone]              "

## 2020-02-14 NOTE — PLAN OF CARE
Problem: Patient Care Overview  Goal: Plan of Care Review  Flowsheets  Taken 2/14/2020 1200  Plan of Care Reviewed With: patient  Taken 2/14/2020 2847  Outcome Summary: Pt having no complaints. Pt to d/c in AM. VSS. RN will continue to monitor.

## 2020-02-14 NOTE — PROGRESS NOTES
[facility] Medicine Services  PROGRESS NOTE    Patient Name: Sheila Gipson  : 1950  MRN: [id number]    Date of Admission: 2020  Primary Care Physician: Tu Lyons MD    Subjective   Subjective     CC:  Follow-up for acute on chronic hypoxic respiratory failure    HPI:  No acute events overnight. Feeling much better. Denies worsening dyspnea, or cough.     Review of Systems  Gen- No fevers, chills  CV- No chest pain, palpitations  GI- No N/V/D, abd pain     Objective   Objective     Vital Signs:   Temp:  [97.8 °F (36.6 °C)-98.4 °F (36.9 °C)] 98.4 °F (36.9 °C)  Heart Rate:  [47-80] 64  Resp:  [16-18] 18  BP: (130-172)/(78-90) 166/85        Physical Exam:  Constitutional: No acute distress, awake, alert  HENT: NCAT, mucous membranes moist  Respiratory: minimal wheezing bilaterally, respiratory effort normal on 3L nasal canula   Cardiovascular: RRR, no murmurs, rubs, no lower extremity edema  Gastrointestinal: Positive bowel sounds, soft, nontender, nondistended  Psychiatric: Appropriate affect, cooperative  Neurologic: Oriented x 3, Cranial Nerves grossly intact to confrontation, speech clear  Skin: No rashes on exposed skin     Results Reviewed:  Results from last 7 days   Lab Units 20  0643 02/10/20  0358 20  1619 20  1533   WBC 10*3/mm3 11.33* 13.71* 12.41*  --    HEMOGLOBIN g/dL 14.1 15.4 15.5  --    HEMATOCRIT % 43.8 47.7* 47.9*  --    PLATELETS 10*3/mm3 177 169 183  --    INR   --   --   --  1.06     Results from last 7 days   Lab Units 02/10/20  0358 20  1533   SODIUM mmol/L 140 144   POTASSIUM mmol/L 4.1 4.3   CHLORIDE mmol/L 98 100   CO2 mmol/L 30.0* 32.0*   BUN mg/dL 42* 44*   CREATININE mg/dL 0.74 0.86   GLUCOSE mg/dL 110* 116*   CALCIUM mg/dL 8.7 9.1   ALT (SGPT) U/L  --  29   AST (SGOT) U/L  --  29   PROBNP pg/mL  --  1,363.0*     Estimated Creatinine Clearance: 54.3 mL/min (by C-G formula based on SCr of 0.74  mg/dL).    Microbiology Results Abnormal     Procedure Component Value - Date/Time    Blood Culture - Blood, Arm, Left [[id number]] Collected:  02/09/20 1533    Lab Status:  Preliminary result Specimen:  Blood from Arm, Left Updated:  02/13/20 1630     Blood Culture No growth at 4 days    Blood Culture - Blood, Arm, Right [[id number]] Collected:  02/09/20 1542    Lab Status:  Preliminary result Specimen:  Blood from Arm, Right Updated:  02/13/20 1630     Blood Culture No growth at 4 days          Imaging Results (Last 24 Hours)     ** No results found for the last 24 hours. **          Results for orders placed during the hospital encounter of 02/09/20   Adult Transthoracic Echo Complete W/ Cont if Necessary Per Protocol    Narrative · Estimated EF = 55%.  · The cardiac valves are anatomically and functionally normal.          I have reviewed the medications:  Scheduled Meds:  arformoterol 15 mcg Nebulization BID - RT   budesonide 0.5 mg Nebulization BID - RT   cefTRIAXone 1 g Intravenous Q24H   cetirizine 10 mg Oral Daily   heparin (porcine) 5,000 Units Subcutaneous Q8H   ipratropium-albuterol 3 mL Nebulization 4x Daily - RT   methylPREDNISolone sodium succinate 60 mg Intravenous Q12H   nicotine 1 patch Transdermal Q24H   pharmacy consult - MTM  Does not apply Daily   polyethylene glycol 17 g Oral Daily   propranolol 10 mg Oral BID   sodium chloride 10 mL Intravenous Q12H     Continuous Infusions:   PRN Meds:.•  acetaminophen **OR** acetaminophen **OR** acetaminophen  •  bisacodyl  •  docusate sodium  •  famotidine  •  labetalol  •  melatonin  •  ondansetron **OR** ondansetron  •  simethicone  •  sodium chloride    Assessment/Plan   Assessment & Plan     Active Hospital Problems    Diagnosis  POA   • **Acute respiratory failure with hypoxia (CMS/HCC) [J96.01]  Yes   • Tobacco use [Z72.0]  Yes   • COPD with acute exacerbation (CMS/HCC) [J44.1]  Yes   • Influenza [J11.1]  Yes   • Benign essential tremor [G25.0]  Yes    • Unintentional weight loss [R63.4]  Yes   • Essential hypertension [I10]  Yes   • Hypertensive urgency [I16.0]  Yes   • Debility [R53.81]  Yes      Resolved Hospital Problems   No resolved problems to display.        Brief Hospital Course to date:  Sheila Gipson is a 69 y.o. female with PMH significant for HTN, COPD, tobacco use who was admitted on 2/9 for acute on chronic hypoxic respiratory failure on 3L nasal canula at home, now back at baseline respiratory status.     Acute on chronic  hypoxic respiratory failure   COPD  CT PE negative for PE  -Continue Brovana, Pulmicort and duo nebs   -transition to prednisone 40 mg po today will need taper   -continue ceftriaxone/azithromycin x 7 days total - completed tamiflu      Sinus bradycardia with sinus arrhythmia - stable   -EKG unchanged from previous in 2010   -Echo with EF wnl no diastolic dysfunction mentioned      Tremor - stable - continue propanolol     DVT Prophylaxis: Heparin     Disposition: I expect the patient to be discharged pending placement.    CODE STATUS:   Code Status and Medical Interventions:   Ordered at: 02/09/20 1526     Code Status:    No CPR     Medical Interventions (Level of Support Prior to Arrest):    Full     Comments:    ok with intubation no CPR         Electronically signed by Ann-Marie Elliott MD, 02/14/20, 2:07 PM.

## 2020-02-14 NOTE — PROGRESS NOTES
Continued Stay Note  [facility]     Patient Name: Sheila Gipson  MRN: [id number]  Today's Date: 2/14/2020    Admit Date: 2/9/2020    Discharge Plan     Row Name 02/14/20 1022       Plan    Plan  [facility]    Patient/Family in Agreement with Plan  yes    Plan Comments  Mrs. Gipson has been offered a skilled bed pending insurance precert. They are going to initiate insurance precert today. I notified her and her daughter Marta. They are agreeable to this. Family will transport Mrs. Gipson there tomorrow if insurance approval is obtained. [facility] is going to provide a portable oxygen tank at the bedside for MRs. Gipson to use during transport there.    Mrs. Gipson has been started on Brovana and Pulmicort since admission here. I submitted prior authorizations online at [email or web address]. Per [employer], Brovana is not covered and if needed Mrs. Gipson will have to pay out of pocket for this. She will most likely not be able to afford it as it is >$300. I did submit a prior authorization for Pulmicort which has been pended for review. This medication is nonformulary for [employer] Medicare.    Final Discharge Disposition Code  03 - skilled nursing facility (SNF)        Discharge Codes    No documentation.             Chalino Vasquez RN

## 2020-02-15 PROCEDURE — 99232 SBSQ HOSP IP/OBS MODERATE 35: CPT | Performed by: INTERNAL MEDICINE

## 2020-02-15 PROCEDURE — 63710000001 PREDNISONE PER 1 MG: Performed by: INTERNAL MEDICINE

## 2020-02-15 PROCEDURE — 25010000002 CEFTRIAXONE PER 250 MG: Performed by: INTERNAL MEDICINE

## 2020-02-15 PROCEDURE — 97116 GAIT TRAINING THERAPY: CPT

## 2020-02-15 PROCEDURE — 25010000002 HEPARIN (PORCINE) PER 1000 UNITS: Performed by: INTERNAL MEDICINE

## 2020-02-15 PROCEDURE — 94799 UNLISTED PULMONARY SVC/PX: CPT

## 2020-02-15 RX ORDER — LISINOPRIL 10 MG/1
10 TABLET ORAL
Status: DISCONTINUED | OUTPATIENT
Start: 2020-02-15 | End: 2020-02-15

## 2020-02-15 RX ORDER — IPRATROPIUM BROMIDE AND ALBUTEROL SULFATE 2.5; .5 MG/3ML; MG/3ML
3 SOLUTION RESPIRATORY (INHALATION) EVERY 6 HOURS PRN
Status: DISCONTINUED | OUTPATIENT
Start: 2020-02-15 | End: 2020-02-17 | Stop reason: HOSPADM

## 2020-02-15 RX ORDER — AMLODIPINE BESYLATE 10 MG/1
10 TABLET ORAL
Status: DISCONTINUED | OUTPATIENT
Start: 2020-02-16 | End: 2020-02-17 | Stop reason: HOSPADM

## 2020-02-15 RX ADMIN — SODIUM CHLORIDE, PRESERVATIVE FREE 10 ML: 5 INJECTION INTRAVENOUS at 22:06

## 2020-02-15 RX ADMIN — PROPRANOLOL HYDROCHLORIDE 10 MG: 10 TABLET ORAL at 21:31

## 2020-02-15 RX ADMIN — MELATONIN TAB 5 MG 5 MG: 5 TAB at 21:31

## 2020-02-15 RX ADMIN — BUDESONIDE 0.5 MG: 0.5 INHALANT RESPIRATORY (INHALATION) at 19:54

## 2020-02-15 RX ADMIN — POLYETHYLENE GLYCOL 3350 17 G: 17 POWDER, FOR SOLUTION ORAL at 08:02

## 2020-02-15 RX ADMIN — SODIUM CHLORIDE, PRESERVATIVE FREE 10 ML: 5 INJECTION INTRAVENOUS at 08:06

## 2020-02-15 RX ADMIN — CEFTRIAXONE 1 G: 1 INJECTION, POWDER, FOR SOLUTION INTRAMUSCULAR; INTRAVENOUS at 15:10

## 2020-02-15 RX ADMIN — IPRATROPIUM BROMIDE AND ALBUTEROL SULFATE 3 ML: 2.5; .5 SOLUTION RESPIRATORY (INHALATION) at 16:35

## 2020-02-15 RX ADMIN — IPRATROPIUM BROMIDE AND ALBUTEROL SULFATE 3 ML: 2.5; .5 SOLUTION RESPIRATORY (INHALATION) at 12:14

## 2020-02-15 RX ADMIN — HEPARIN SODIUM 5000 UNITS: 5000 INJECTION, SOLUTION INTRAVENOUS; SUBCUTANEOUS at 15:09

## 2020-02-15 RX ADMIN — CETIRIZINE HYDROCHLORIDE 10 MG: 10 TABLET, FILM COATED ORAL at 08:02

## 2020-02-15 RX ADMIN — ARFORMOTEROL TARTRATE 15 MCG: 15 SOLUTION RESPIRATORY (INHALATION) at 19:56

## 2020-02-15 RX ADMIN — BUDESONIDE 0.5 MG: 0.5 INHALANT RESPIRATORY (INHALATION) at 07:50

## 2020-02-15 RX ADMIN — FAMOTIDINE 20 MG: 20 TABLET ORAL at 08:03

## 2020-02-15 RX ADMIN — PROPRANOLOL HYDROCHLORIDE 10 MG: 10 TABLET ORAL at 08:02

## 2020-02-15 RX ADMIN — HEPARIN SODIUM 5000 UNITS: 5000 INJECTION, SOLUTION INTRAVENOUS; SUBCUTANEOUS at 21:31

## 2020-02-15 RX ADMIN — IPRATROPIUM BROMIDE AND ALBUTEROL SULFATE 3 ML: 2.5; .5 SOLUTION RESPIRATORY (INHALATION) at 07:50

## 2020-02-15 RX ADMIN — PREDNISONE 40 MG: 20 TABLET ORAL at 08:03

## 2020-02-15 RX ADMIN — NICOTINE 1 PATCH: 14 PATCH, EXTENDED RELEASE TRANSDERMAL at 08:04

## 2020-02-15 RX ADMIN — IPRATROPIUM BROMIDE AND ALBUTEROL SULFATE 3 ML: 2.5; .5 SOLUTION RESPIRATORY (INHALATION) at 19:54

## 2020-02-15 RX ADMIN — DOCUSATE SODIUM 100 MG: 100 CAPSULE, LIQUID FILLED ORAL at 09:42

## 2020-02-15 RX ADMIN — ARFORMOTEROL TARTRATE 15 MCG: 15 SOLUTION RESPIRATORY (INHALATION) at 07:49

## 2020-02-15 RX ADMIN — LISINOPRIL 10 MG: 10 TABLET ORAL at 09:30

## 2020-02-15 RX ADMIN — IPRATROPIUM BROMIDE AND ALBUTEROL SULFATE 3 ML: 2.5; .5 SOLUTION RESPIRATORY (INHALATION) at 01:23

## 2020-02-15 NOTE — PROGRESS NOTES
Continued Stay Note  [facility]     Patient Name: Sheila Gipson  MRN: [id number]  Today's Date: 2/15/2020    Admit Date: 2/9/2020    Discharge Plan     Row Name 02/15/20 1433       Plan    Plan Comments  Updated therapy notes faxed to [employer] [phone], per family request. Insurance precert still pending.     Row Name 02/15/20 1328       Plan    Plan  [facility] pending pre-cert    Plan Comments  CM spoke with Harrison at [facility] in [place] and she is unaware of any insurance approval for patient to transfer there as these matters are typically handled by their business office staff who are only there during the week. Pre-cert is still pending; Monday will be the soonest that patient can transfer if she is approved. Lesley Hutchisnon RN [phone]        Discharge Codes    No documentation.             Marion Lozada RN

## 2020-02-15 NOTE — PLAN OF CARE
Problem: Patient Care Overview  Goal: Plan of Care Review  Flowsheets (Taken 2/15/2020 2431)  Progress: improving  Plan of Care Reviewed With: patient;daughter  Outcome Summary: Pt ambulates 66 ft with PT with MIN A for control of walker during turns, MIN A for sit to stand, and MOD A to scoot back into chair. Pt limtied by SOB with SpO2 at 70 % upon return to chair. Pt cued for PLB and returns to 89%. T/f care to [facility] at end of session.

## 2020-02-15 NOTE — PROGRESS NOTES
[facility] Medicine Services  PROGRESS NOTE    Patient Name: Sheila Gipson  : 1950  MRN: [id number]    Date of Admission: 2020  Primary Care Physician: Tu Lyons MD    Subjective   Subjective     CC:  Follow up for acute hypoxic respiratory failure    HPI:  No acute events overnight. Dyspnea stable, without cough.    Review of Systems  Gen- No fevers, chills  CV- No chest pain, palpitations  GI- No N/V/D, abd pain     Objective   Objective     Vital Signs:   Temp:  [97.5 °F (36.4 °C)-99.3 °F (37.4 °C)] 97.5 °F (36.4 °C)  Heart Rate:  [] 67  Resp:  [16-22] 16  BP: (142-183)/(74-99) 156/86        Physical Exam:  Constitutional: No acute distress, awake, alert  HENT: NCAT, mucous membranes moist  Respiratory: no crackles, has mild wheezing, respiratory effort normal on 3L nasal canula   Cardiovascular: RRR, no murmurs, no lower extremity edema   Gastrointestinal: Positive bowel sounds, soft, nontender, nondistended  Psychiatric: Appropriate affect, cooperative  Neurologic: Oriented x 3, Cranial Nerves grossly intact to confrontation, speech clear  Skin: No rashes on exposed skin     Results Reviewed:  Results from last 7 days   Lab Units 20  0643 02/10/20  0358 20  1619 20  1533   WBC 10*3/mm3 11.33* 13.71* 12.41*  --    HEMOGLOBIN g/dL 14.1 15.4 15.5  --    HEMATOCRIT % 43.8 47.7* 47.9*  --    PLATELETS 10*3/mm3 177 169 183  --    INR   --   --   --  1.06     Results from last 7 days   Lab Units 02/10/20  0358 20  1533   SODIUM mmol/L 140 144   POTASSIUM mmol/L 4.1 4.3   CHLORIDE mmol/L 98 100   CO2 mmol/L 30.0* 32.0*   BUN mg/dL 42* 44*   CREATININE mg/dL 0.74 0.86   GLUCOSE mg/dL 110* 116*   CALCIUM mg/dL 8.7 9.1   ALT (SGPT) U/L  --  29   AST (SGOT) U/L  --  29   PROBNP pg/mL  --  1,363.0*     Estimated Creatinine Clearance: 54.3 mL/min (by C-G formula based on SCr of 0.74 mg/dL).    Microbiology Results Abnormal     Procedure  Component Value - Date/Time    Blood Culture - Blood, Arm, Left [[id number]] Collected:  02/09/20 1533    Lab Status:  Final result Specimen:  Blood from Arm, Left Updated:  02/14/20 1630     Blood Culture No growth at 5 days    Blood Culture - Blood, Arm, Right [[id number]] Collected:  02/09/20 1542    Lab Status:  Final result Specimen:  Blood from Arm, Right Updated:  02/14/20 1630     Blood Culture No growth at 5 days          Imaging Results (Last 24 Hours)     ** No results found for the last 24 hours. **          Results for orders placed during the hospital encounter of 02/09/20   Adult Transthoracic Echo Complete W/ Cont if Necessary Per Protocol    Narrative · Estimated EF = 55%.  · The cardiac valves are anatomically and functionally normal.          I have reviewed the medications:  Scheduled Meds:  [START ON 2/16/2020] amLODIPine 10 mg Oral Q24H   arformoterol 15 mcg Nebulization BID - RT   budesonide 0.5 mg Nebulization BID - RT   cefTRIAXone 1 g Intravenous Q24H   cetirizine 10 mg Oral Daily   heparin (porcine) 5,000 Units Subcutaneous Q8H   ipratropium-albuterol 3 mL Nebulization 4x Daily - RT   nicotine 1 patch Transdermal Q24H   pharmacy consult - MTM  Does not apply Daily   polyethylene glycol 17 g Oral Daily   predniSONE 40 mg Oral Daily With Breakfast   propranolol 10 mg Oral BID   sodium chloride 10 mL Intravenous Q12H     Continuous Infusions:   PRN Meds:.•  acetaminophen **OR** acetaminophen **OR** acetaminophen  •  bisacodyl  •  docusate sodium  •  famotidine  •  ipratropium-albuterol  •  labetalol  •  melatonin  •  ondansetron **OR** ondansetron  •  simethicone  •  sodium chloride    Assessment/Plan   Assessment & Plan     Active Hospital Problems    Diagnosis  POA   • **Acute respiratory failure with hypoxia (CMS/HCC) [J96.01]  Yes   • Tobacco use [Z72.0]  Yes   • COPD with acute exacerbation (CMS/HCC) [J44.1]  Yes   • Influenza [J11.1]  Yes   • Benign essential tremor [G25.0]  Yes   •  Unintentional weight loss [R63.4]  Yes   • Essential hypertension [I10]  Yes   • Hypertensive urgency [I16.0]  Yes   • Debility [R53.81]  Yes      Resolved Hospital Problems   No resolved problems to display.        Brief Hospital Course to date:  Sheila Gipson is a 69 y.o. female with PMH significant for HTN, COPD, tobacco use who was admitted on 2/9 for acute on chronic hypoxic respiratory failure on 3L nasal canula at home, now back at baseline respiratory status.     Acute on chronic  hypoxic respiratory failure   COPD  CT PE negative for PE  -Continue Brovana, Pulmicort and duo nebs while inpatient   -continue prednisone 40 mg po - will need taper on discharge   -completed ceftriaxone, azithromycin, and tamiflu treatment      Sinus bradycardia with sinus arrhythmia - stable   -EKG unchanged from previous in 2010   -Echo with EF wnl no diastolic dysfunction mentioned      HTN - stable - continue propanolol - start on amlodipine 10 mg qd      DVT Prophylaxis: Heparin     Disposition: I expect the patient to be discharged pending pre-cert  CODE STATUS:   Code Status and Medical Interventions:   Ordered at: 02/09/20 1526     Code Status:    No CPR     Medical Interventions (Level of Support Prior to Arrest):    Full     Comments:    ok with intubation no CPR         Electronically signed by Ann-Marie Elliott MD, 02/15/20, 2:54 PM.

## 2020-02-15 NOTE — DISCHARGE PLACEMENT REQUEST
"Sheila Harrington (69 y.o. Female)     Date of Birth Social Security Number Address Home Phone MRN    1950  [facility] [place] [id number] [phone] [id number]    Sikhism Marital Status          None        Admission Date Admission Type Admitting Provider Attending Provider Department, Room/Bed    2/9/20 Urgent Ann-Marie Elliott MD Anciro, Audree, MD [address], [address]    Discharge Date Discharge Disposition Discharge Destination                       Attending Provider:  Ann-Marie Elliott MD    Allergies:  No Known Allergies    Isolation:  None   Infection:  None   Code Status:  No CPR    Ht:  167.6 cm (66\")   Wt:  52.6 kg (116 lb)    Admission Cmt:  None   Principal Problem:  Acute respiratory failure with hypoxia (CMS/HCC) [J96.01]                 Active Insurance as of 2/9/2020     Primary Coverage     Payor Plan Insurance Group Employer/Plan Group    [employer] MEDICARE REPLACEMENT HU[employer]NA MEDICARE REPLACEMENT [id number]     Payor Plan Address Payor Plan Phone Number Payor Plan Fax Number Effective Dates    [address] [address] [phone]  1/1/2020 - None Entered    [facility] [id number]       Subscriber Name Subscriber Birth Date Member ID       SHEILA HARRINGTON 1950 [id number]                 Emergency Contacts      (Rel.) Home Phone Work Phone Mobile Phone    Cande Patton (Daughter) -- -- [phone]    TkBekah srivastava (Sister) -- -- [phone]    Aliyah Trevizo (Daughter) -- -- [phone]    Israel Mccarthy (Daughter) -- -- [phone]               Physical Therapy Notes (last 72 hours) (Notes from 02/12/20 1432 through 02/15/20 1432)      Kimberly Bullock, PT at 02/13/20 1513  Version 1 of 1         Problem: Patient Care Overview  Goal: Plan of Care Review  Outcome: Ongoing (interventions implemented as appropriate)  Flowsheets (Taken 2/13/2020 1619)  Progress: improving  Outcome Summary: Pt able to perform STS t/f with CGA and bed to chair t/f with min A. "  Continues to be limited by decreased activity tolerance, SOA, and weakness.  Continue per IPPT POC.       Electronically signed by Kimberly Bullock PT at 20     Kimberly Bullock PT at 20 1513  Version 1 of 1         Patient Name: Sheila Gipson  : 1950    MRN: [id number]                              Today's Date: 2020       Admit Date: 2020    Visit Dx:     ICD-10-CM ICD-9-CM   1. Impaired mobility and ADLs Z74.09 799.89     Patient Active Problem List   Diagnosis   • Acute respiratory failure with hypoxia (CMS/HCC)   • Tobacco use   • COPD with acute exacerbation (CMS/HCC)   • Influenza   • Benign essential tremor   • Unintentional weight loss   • Essential hypertension   • Hypertensive urgency   • Debility     Past Medical History:   Diagnosis Date   • COPD (chronic obstructive pulmonary disease) (CMS/HCC)    • Hypertension      History reviewed. No pertinent surgical history.  General Information     Row Name 20 1617          PT Evaluation Time/Intention    Document Type  therapy note (daily note)  -ES     Mode of Treatment  individual therapy;physical therapy  -ES     Row Name 20 161          General Information    Patient Profile Reviewed?  yes  -ES     Existing Precautions/Restrictions  fall;oxygen therapy device and L/min  -ES     Row Name 20          Safety Issues, Functional Mobility    Safety Issues Affecting Function (Mobility)  safety precaution awareness;safety precautions follow-through/compliance;insight into deficits/self awareness;awareness of need for assistance  -ES     Impairments Affecting Function (Mobility)  balance;endurance/activity tolerance;shortness of breath;strength  -ES       User Key  (r) = Recorded By, (t) = Taken By, (c) = Cosigned By    Initials Name Provider Type    ES Kimberly Bullock, PT Physical Therapist        Mobility     Row Name 20 1617          Bed Mobility Assessment/Treatment    Bed Mobility  Assessment/Treatment  sit-supine  -ES     Scooting/Bridging [place] (Bed Mobility)  supervision;verbal cues  -ES     Sit-Supine [place] (Bed Mobility)  contact guard;verbal cues  -ES     Assistive Device (Bed Mobility)  bed rails;head of bed elevated  -ES     Comment (Bed Mobility)  cues for sequencing  -ES     Row Name 02/13/20 1617          Bed-Chair Transfer    Bed-Chair [place] (Transfers)  minimum assist (75% patient effort);verbal cues  -ES     Assistive Device (Bed-Chair Transfers)  -- 1 HHA  -ES     Row Name 02/13/20 1617          Sit-Stand Transfer    Sit-Stand [place] (Transfers)  contact guard  -ES     Assistive Device (Sit-Stand Transfers)  -- no AD  -ES     Row Name 02/13/20 1617          Gait/Stairs Assessment/Training    [place] Level (Gait)  not tested  -ES     Comment (Gait/Stairs)  Deferred, pt already had walked to bathroom, requesting to get back to bed  -ES       User Key  (r) = Recorded By, (t) = Taken By, (c) = Cosigned By    Initials Name Provider Type    Kimberly Ocampo, PT Physical Therapist        Obj/Interventions     Row Name 02/13/20 1619          Therapeutic Exercise    Lower Extremity (Therapeutic Exercise)  gluteal sets;quad sets, bilateral  -ES     Lower Extremity Range of Motion (Therapeutic Exercise)  hip abduction/adduction, bilateral;ankle dorsiflexion/plantar flexion, bilateral  -ES     Exercise Type (Therapeutic Exercise)  AROM (active range of motion);isometric contraction, static  -ES     Position (Therapeutic Exercise)  supine  -ES     Sets/Reps (Therapeutic Exercise)  1/10  -ES     Comment (Therapeutic Exercise)  Fatigues quickly with exercises, cues for technique  -ES       User Key  (r) = Recorded By, (t) = Taken By, (c) = Cosigned By    Initials Name Provider Type    Kimberly Ocampo, PT Physical Therapist        Goals/Plan    No documentation.       Clinical Impression     Row Name 02/13/20 1619          Pain Scale: Numbers  Pre/Post-Treatment    Pain Scale: Numbers, Pretreatment  0/10 - no pain  -ES     Pain Scale: Numbers, Post-Treatment  0/10 - no pain  -ES     Row Name 02/13/20 1619          Plan of Care Review    Plan of Care Reviewed With  patient  -ES     Progress  improving  -ES     Outcome Summary  Pt able to perform STS t/f with CGA and bed to chair t/f with min A.  Continues to be limited by decreased activity tolerance, SOA, and weakness.  Continue per IPPT POC.  -ES     Row Name 02/13/20 1619          Vital Signs    Pre Systolic BP Rehab  160  -ES     Pre Treatment Diastolic BP  89  -ES     Pretreatment Heart Rate (beats/min)  55  -ES     Posttreatment Heart Rate (beats/min)  60  -ES     Pre SpO2 (%)  93  -ES     O2 Delivery Pre Treatment  supplemental O2  -ES     Intra SpO2 (%)  80  -ES     O2 Delivery Intra Treatment  supplemental O2  -ES     Post SpO2 (%)  98  -ES     O2 Delivery Post Treatment  supplemental O2  -ES     Pre Patient Position  Sitting  -ES     Intra Patient Position  Standing  -ES     Post Patient Position  Supine  -ES     Row Name 02/13/20 1619          Positioning and Restraints    Pre-Treatment Position  sitting in chair/recliner  -ES     Post Treatment Position  bed  -ES     In Bed  notified nsg;supine;call light within reach;encouraged to call for assist;exit alarm on;with family/caregiver  -ES       User Key  (r) = Recorded By, (t) = Taken By, (c) = Cosigned By    Initials Name Provider Type    ES Kimberly Bullock, PT Physical Therapist        Outcome Measures     Row Name 02/13/20 1621          How much help from another person do you currently need...    Turning from your back to your side while in flat bed without using bedrails?  4  -ES     Moving from lying on back to sitting on the side of a flat bed without bedrails?  3  -ES     Moving to and from a bed to a chair (including a wheelchair)?  3  -ES     Standing up from a chair using your arms (e.g., wheelchair, bedside chair)?  3  -ES      Climbing 3-5 steps with a railing?  2  -ES     To walk in hospital room?  3  -ES     AM-PAC 6 Clicks Score (PT)  18  -ES     Row Name 02/13/20 1621          Functional Assessment    Outcome Measure Options  AM-PAC 6 Clicks Basic Mobility (PT)  -ES       User Key  (r) = Recorded By, (t) = Taken By, (c) = Cosigned By    Initials Name Provider Type    Kimberly Ocampo, PT Physical Therapist          PT Recommendation and Plan     Plan of Care Reviewed With: patient  Progress: improving  Outcome Summary: Pt able to perform STS t/f with CGA and bed to chair t/f with min A.  Continues to be limited by decreased activity tolerance, SOA, and weakness.  Continue per IPPT POC.     Time Calculation:   PT Charges     Row Name 02/13/20 1622             Time Calculation    Start Time  1513  -ES      PT Received On  02/13/20  -ES      PT Goal Re-Cert Due Date  02/22/20  -ES         Timed Charges    95461 - PT Therapeutic Exercise Minutes  8  -ES      87830 - PT Therapeutic Activity Minutes  5  -ES        User Key  (r) = Recorded By, (t) = Taken By, (c) = Cosigned By    Initials Name Provider Type    Kimberly Ocampo, PT Physical Therapist        Therapy Charges for Today     Code Description Service Date Service Provider Modifiers Qty    63916852723 HC PT THER PROC EA 15 MIN 2/13/2020 Kimberly Bullock PT GP 1          PT G-Codes  Outcome Measure Options: AM-PAC 6 Clicks Basic Mobility (PT)  AM-PAC 6 Clicks Score (PT): 18  AM-PAC 6 Clicks Score (OT): 15    Kimberly Bullock PT  2/13/2020         Electronically signed by Kimberly Bullock PT at 02/13/20 1622       Occupational Therapy Notes (last 72 hours) (Notes from 02/12/20 1432 through 02/15/20 1432)    No notes exist for this encounter.

## 2020-02-15 NOTE — PROGRESS NOTES
Continued Stay Note  [facility]     Patient Name: Sheila Gipson  MRN: [id number]  Today's Date: 2/15/2020    Admit Date: 2/9/2020    Discharge Plan     Row Name 02/15/20 1328       Plan    Plan  [facility] pending pre-cert    Plan Comments  CM spoke with Harrison at [facility] in [place] and she is unaware of any insurance approval for patient to transfer there as these matters are typically handled by their business office staff who are only there during the week. Pre-cert is still pending; Monday will be the soonest that patient can transfer if she is approved. Lesley Hutchinson RN [phone]    Update @ 2713: Spoke with patient and daughter Aliyah at bedside regarding insurance pre-cert. Notified them that we must hear from the business office/admissions at [facility] that pre-cert has been approved by [employer] before we can transfer patient to their facility, which will be Monday at the earliest. They verbalized understanding. Lesley Hutchinson RN [phone]        Discharge Codes    No documentation.             Lesley Hutchinson RN

## 2020-02-15 NOTE — THERAPY TREATMENT NOTE
Patient Name: Sheila Gipson  : 1950    MRN: [id number]                              Today's Date: 2/15/2020       Admit Date: 2020    Visit Dx:     ICD-10-CM ICD-9-CM   1. Impaired mobility and ADLs Z74.09 799.89     Patient Active Problem List   Diagnosis   • Acute respiratory failure with hypoxia (CMS/HCC)   • Tobacco use   • COPD with acute exacerbation (CMS/[facility])   • Influenza   • Benign essential tremor   • Unintentional weight loss   • Essential hypertension   • Hypertensive urgency   • Debility     Past Medical History:   Diagnosis Date   • COPD (chronic obstructive pulmonary disease) (CMS/HCC)    • Hypertension      History reviewed. No pertinent surgical history.  General Information     Row Name 02/15/20 1557          PT Evaluation Time/Intention    Document Type  therapy note (daily note)  -     Mode of Treatment  individual therapy;physical therapy  -     Row Name 02/15/20 1557          General Information    Patient Profile Reviewed?  yes  -EJ     Existing Precautions/Restrictions  fall;oxygen therapy device and L/min  -EJ     Barriers to Rehab  medically complex  -     Row Name 02/15/20 1557          Cognitive Assessment/Intervention- PT/OT    Orientation Status (Cognition)  oriented x 4  -     Row Name 02/15/20 1557          Safety Issues, Functional Mobility    Safety Issues Affecting Function (Mobility)  safety precautions follow-through/compliance;safety precaution awareness;awareness of need for assistance;insight into deficits/self awareness  -     Impairments Affecting Function (Mobility)  balance;endurance/activity tolerance;shortness of breath;strength  -       User Key  (r) = Recorded By, (t) = Taken By, (c) = Cosigned By    Initials Name Provider Type     Sheila Mae PT Physical Therapist        Mobility     Row Name 02/15/20 1559          Bed Mobility Assessment/Treatment    Comment (Bed Mobility)  UIC, returned to chair  -     Row Name 02/15/20 1557           Transfer Assessment/Treatment    Comment (Transfers)  VC for hand placement, scooting back when returned to chair. Despite cues for hand placement pt pulled up to walker.  -     Row Name 02/15/20 1550          Sit-Stand Transfer    Sit-Stand [place] (Transfers)  minimum assist (75% patient effort)  -EJ     Assistive Device (Sit-Stand Transfers)  walker, front-wheeled  -EJ     Row Name 02/15/20 1555          Gait/Stairs Assessment/Training    [place] Level (Gait)  minimum assist (75% patient effort)  -EJ     Assistive Device (Gait)  walker, front-wheeled  -EJ     Distance in Feet (Gait)  66  -EJ     Pattern (Gait)  step-to  -EJ     Deviations/Abnormal Patterns (Gait)  ataxic  -EJ     Bilateral Gait Deviations  forward flexed posture  -EJ     Left Sided Gait Deviations  heel strike decreased  -EJ     Right Sided Gait Deviations  heel strike decreased  -EJ     Comment (Gait/Stairs)  Pt reports having trouble controling her R foot, slight drop foot. Pt looks down to place it, appears to have ataxia.  Pt cued for use of walker, holding it close, maitaining upright posture. Needs much assist with control of walker during turns.  -EJ       User Key  (r) = Recorded By, (t) = Taken By, (c) = Cosigned By    Initials Name Provider Type    EJ hSeila Mae PT Physical Therapist        Obj/Interventions    No documentation.       Goals/Plan    No documentation.       Clinical Impression     Row Name 02/15/20 2204          Pain Assessment    Additional Documentation  Pain Scale: Numbers Pre/Post-Treatment (Group)  -[facility] Name 02/15/20 3087          Pain Scale: Numbers Pre/Post-Treatment    Pain Scale: Numbers, Pretreatment  0/10 - no pain  -     Pain Scale: Numbers, Post-Treatment  0/10 - no pain  -EJ     Pain Intervention(s)  Repositioned;Ambulation/increased activity  -     Row Name 02/15/20 8281          Plan of Care Review    Plan of Care Reviewed With  patient;daughter  -     Progress   improving  -EJ     Outcome Summary  Pt ambulates 66 ft with PT with MIN A for control of walker during turns, MIN A for sit to stand, and MOD A to scoot back into chair. Pt limtied by SOB with SpO2 at 70 % upon return to chair. Pt cued for PLB and returns to 89%. T/f care to [facility] at end of session.  -EJ     Row Name 02/15/20 1603          Vital Signs    Pre SpO2 (%)  93  -EJ     O2 Delivery Pre Treatment  supplemental O2 3L throughout  -EJ     Intra SpO2 (%)  70  -EJ     O2 Delivery Intra Treatment  supplemental O2  -EJ     Post SpO2 (%)  89  -EJ     O2 Delivery Post Treatment  supplemental O2  -EJ     Pre Patient Position  Sitting  -EJ     Intra Patient Position  Standing  -EJ     Post Patient Position  Sitting  -EJ     Recovery Time  1-2 minutes cued for PLB  -EJ     Row Name 02/15/20 1603          Positioning and Restraints    Pre-Treatment Position  sitting in chair/recliner  -EJ     Post Treatment Position  chair  -EJ     In Bed  call light within reach;encouraged to call for assist;with family/caregiver;with nsg  -EJ       User Key  (r) = Recorded By, (t) = Taken By, (c) = Cosigned By    Initials Name Provider Type    EJ Sheila Mae, PT Physical Therapist        Outcome Measures     Row Name 02/15/20 1606          How much help from another person do you currently need...    Turning from your back to your side while in flat bed without using bedrails?  4  -EJ     Moving from lying on back to sitting on the side of a flat bed without bedrails?  3  -EJ     Moving to and from a bed to a chair (including a wheelchair)?  3  -EJ     Standing up from a chair using your arms (e.g., wheelchair, bedside chair)?  3  -EJ     Climbing 3-5 steps with a railing?  2  -EJ     To walk in hospital room?  3  -EJ     AM-PAC 6 Clicks Score (PT)  18  -EJ     Row Name 02/15/20 1606          Functional Assessment    Outcome Measure Options  AM-PAC 6 Clicks Basic Mobility (PT)  -EJ       User Key  (r) = Recorded By, (t) = Taken  By, (c) = Cosigned By    Initials Name Provider Type    Sheila Zapien PT Physical Therapist          PT Recommendation and Plan     Plan of Care Reviewed With: patient, daughter  Progress: improving  Outcome Summary: Pt ambulates 66 ft with PT with MIN A for control of walker during turns, MIN A for sit to stand, and MOD A to scoot back into chair. Pt limtied by SOB with SpO2 at 70 % upon return to chair. Pt cued for PLB and returns to 89%. T/f care to [facility] [place] end of [place].     Time Calculation:   PT Charges     Row Name 02/15/20 1607             Time Calculation    Start Time  1459  -EJ      PT Received On  02/15/20  -EJ      PT Goal Re-Cert Due Date  02/25/20  -EJ         Time Calculation- PT    Total Timed Code Minutes- PT  15 minute(s)  -EJ         Timed Charges    64961 - Gait Training Minutes   15  -EJ        User Key  (r) = Recorded By, (t) = Taken By, (c) = Cosigned By    Initials Name Provider Type    Sheila Zapien PT Physical Therapist        Therapy Charges for Today     Code Description Service Date Service Provider Modifiers Qty    [id number] HC GAIT TRAINING EA 15 MIN 2/15/2020 Sheila Mae PT GP 1          PT G-Codes  Outcome Measure Options: AM-PAC 6 Clicks Basic Mobility (PT)  AM-PAC 6 Clicks Score (PT): 18  AM-PAC 6 Clicks Score (OT): 15    Sheila Garcia PT  2/15/2020

## 2020-02-15 NOTE — PLAN OF CARE
Problem: Patient Care Overview  Goal: Plan of Care Review  Flowsheets  Taken 2/15/2020 1700  Progress: no change  Outcome Summary: Patient up in chair today, VSS on 3L o2 (baseline). Waiting on insurance approval for transfer to [facility] in [place]. Will continue to monitor.  Taken 2/15/2020 1645  Plan of Care Reviewed With: patient;daughter

## 2020-02-16 PROCEDURE — 94799 UNLISTED PULMONARY SVC/PX: CPT

## 2020-02-16 PROCEDURE — 97535 SELF CARE MNGMENT TRAINING: CPT

## 2020-02-16 PROCEDURE — 25010000002 HEPARIN (PORCINE) PER 1000 UNITS: Performed by: INTERNAL MEDICINE

## 2020-02-16 PROCEDURE — 99232 SBSQ HOSP IP/OBS MODERATE 35: CPT | Performed by: NURSE PRACTITIONER

## 2020-02-16 PROCEDURE — 97110 THERAPEUTIC EXERCISES: CPT

## 2020-02-16 PROCEDURE — 63710000001 PREDNISONE PER 1 MG: Performed by: INTERNAL MEDICINE

## 2020-02-16 RX ORDER — BUDESONIDE 0.5 MG/2ML
0.5 INHALANT ORAL 2 TIMES DAILY PRN
Status: DISCONTINUED | OUTPATIENT
Start: 2020-02-16 | End: 2020-02-17 | Stop reason: HOSPADM

## 2020-02-16 RX ORDER — ARFORMOTEROL TARTRATE 15 UG/2ML
15 SOLUTION RESPIRATORY (INHALATION) 2 TIMES DAILY PRN
Status: DISCONTINUED | OUTPATIENT
Start: 2020-02-16 | End: 2020-02-17 | Stop reason: HOSPADM

## 2020-02-16 RX ORDER — IPRATROPIUM BROMIDE AND ALBUTEROL SULFATE 2.5; .5 MG/3ML; MG/3ML
3 SOLUTION RESPIRATORY (INHALATION) EVERY 4 HOURS PRN
Status: DISCONTINUED | OUTPATIENT
Start: 2020-02-16 | End: 2020-02-16

## 2020-02-16 RX ADMIN — MELATONIN TAB 5 MG 5 MG: 5 TAB at 20:35

## 2020-02-16 RX ADMIN — SIMETHICONE CHEW TAB 80 MG 80 MG: 80 TABLET ORAL at 20:35

## 2020-02-16 RX ADMIN — HEPARIN SODIUM 5000 UNITS: 5000 INJECTION, SOLUTION INTRAVENOUS; SUBCUTANEOUS at 09:02

## 2020-02-16 RX ADMIN — NYSTATIN AND TRIAMCINOLONE ACETONIDE: 100000; 1 CREAM TOPICAL at 20:36

## 2020-02-16 RX ADMIN — HEPARIN SODIUM 5000 UNITS: 5000 INJECTION, SOLUTION INTRAVENOUS; SUBCUTANEOUS at 20:35

## 2020-02-16 RX ADMIN — IPRATROPIUM BROMIDE AND ALBUTEROL SULFATE 3 ML: 2.5; .5 SOLUTION RESPIRATORY (INHALATION) at 09:07

## 2020-02-16 RX ADMIN — POLYETHYLENE GLYCOL 3350 17 G: 17 POWDER, FOR SOLUTION ORAL at 09:00

## 2020-02-16 RX ADMIN — CETIRIZINE HYDROCHLORIDE 10 MG: 10 TABLET, FILM COATED ORAL at 08:58

## 2020-02-16 RX ADMIN — PROPRANOLOL HYDROCHLORIDE 10 MG: 10 TABLET ORAL at 20:35

## 2020-02-16 RX ADMIN — PREDNISONE 40 MG: 20 TABLET ORAL at 08:58

## 2020-02-16 RX ADMIN — PROPRANOLOL HYDROCHLORIDE 10 MG: 10 TABLET ORAL at 08:59

## 2020-02-16 RX ADMIN — AMLODIPINE BESYLATE 10 MG: 10 TABLET ORAL at 08:58

## 2020-02-16 RX ADMIN — IPRATROPIUM BROMIDE AND ALBUTEROL SULFATE 3 ML: 2.5; .5 SOLUTION RESPIRATORY (INHALATION) at 02:26

## 2020-02-16 RX ADMIN — HEPARIN SODIUM 5000 UNITS: 5000 INJECTION, SOLUTION INTRAVENOUS; SUBCUTANEOUS at 14:26

## 2020-02-16 RX ADMIN — IPRATROPIUM BROMIDE AND ALBUTEROL SULFATE 3 ML: 2.5; .5 SOLUTION RESPIRATORY (INHALATION) at 13:26

## 2020-02-16 RX ADMIN — BUDESONIDE 0.5 MG: 0.5 INHALANT RESPIRATORY (INHALATION) at 20:44

## 2020-02-16 RX ADMIN — NYSTATIN AND TRIAMCINOLONE ACETONIDE 1 APPLICATION: 100000; 1 CREAM TOPICAL at 12:38

## 2020-02-16 RX ADMIN — NICOTINE 1 PATCH: 14 PATCH, EXTENDED RELEASE TRANSDERMAL at 09:00

## 2020-02-16 RX ADMIN — SODIUM CHLORIDE, PRESERVATIVE FREE 10 ML: 5 INJECTION INTRAVENOUS at 09:01

## 2020-02-16 NOTE — PROGRESS NOTES
[facility] Medicine Services  PROGRESS NOTE    Patient Name: Sheila Gipson  : 1950  MRN: [id number]    Date of Admission: 2020  Length of Stay: 7  Primary Care Physician: Tu Lyons MD    Subjective   Subjective     CC:  Acute respiratory failure with hypoxia (CMS/HCC)    HPI:  Patient resting in chair in no apparent distress. No family currently at bedside. Nursing notes reviewed. No acute events overnight. Patient reports that she feels well today but is dyspneic on exertion. She is also complaining of a rash on her left foot. She reports that that the rash has been present for a few weeks but has worsened over the last two days. She experiences symptoms of burning and itching that seems to get worse if she scratches that rash. At home she put a 'cream' on her foot before going to be that seems to help. No additional complaints at this time.     ROS:  Gen- No fevers, chills  CV- No chest pain, palpitations  Resp- No cough, dyspnea on exertion present  GI- No N/V/D, abd pain    Objective   Objective     Vital Signs:   Temp:  [98.3 °F (36.8 °C)-98.8 °F (37.1 °C)] 98.3 °F (36.8 °C)  Heart Rate:  [54-99] 80  Resp:  [16] 16  BP: (101-161)/(58-94) 102/72        Physical Exam:  Constitutional: No acute distress, awake, alert  HENT: NCAT, mucous membranes moist  Respiratory: Clear to auscultation bilaterally, respiratory effort normal on 3L nc  Cardiovascular: RRR, no murmurs, rubs, or gallops, palpable pedal pulses bilaterally  Gastrointestinal: Positive bowel sounds, soft, nontender, nondistended  Musculoskeletal: No bilateral ankle edema  Psychiatric: Appropriate affect, cooperative  Neurologic: Oriented x 3, strength symmetric in all extremities, speech clear  Skin: warm, dry, erythematous left foot rash noted, cap refill <3 seconds    Results Reviewed:  I have personally reviewed current lab, radiology, and data and agree.    Results from last 7 days   Lab Units  02/14/20  0643 02/10/20  0358 02/09/20  1619 02/09/20  1533   WBC 10*3/mm3 11.33* 13.71* 12.41*  --    HEMOGLOBIN g/dL 14.1 15.4 15.5  --    HEMATOCRIT % 43.8 47.7* 47.9*  --    PLATELETS 10*3/mm3 177 169 183  --    INR   --   --   --  1.06     Results from last 7 days   Lab Units 02/10/20  0358 02/09/20  1533   SODIUM mmol/L 140 144   POTASSIUM mmol/L 4.1 4.3   CHLORIDE mmol/L 98 100   CO2 mmol/L 30.0* 32.0*   BUN mg/dL 42* 44*   CREATININE mg/dL 0.74 0.86   GLUCOSE mg/dL 110* 116*   CALCIUM mg/dL 8.7 9.1   ALT (SGPT) U/L  --  29   AST (SGOT) U/L  --  29     Estimated Creatinine Clearance: 54.3 mL/min (by C-G formula based on SCr of 0.74 mg/dL).  No results found for: BNP    Microbiology Results Abnormal     Procedure Component Value - Date/Time    Blood Culture - Blood, Arm, Left [[id number]] Collected:  02/09/20 1533    Lab Status:  Final result Specimen:  Blood from Arm, Left Updated:  02/14/20 1630     Blood Culture No growth at 5 days    Blood Culture - Blood, Arm, Right [[id number]] Collected:  02/09/20 1542    Lab Status:  Final result Specimen:  Blood from Arm, Right Updated:  02/14/20 1630     Blood Culture No growth at 5 days          Imaging Results (Last 24 Hours)     ** No results found for the last 24 hours. **        Results for orders placed during the hospital encounter of 02/09/20   Adult Transthoracic Echo Complete W/ Cont if Necessary Per Protocol    Narrative · Estimated EF = 55%.  · The cardiac valves are anatomically and functionally normal.          I have reviewed the medications.  Scheduled Meds:  amLODIPine 10 mg Oral Q24H   cetirizine 10 mg Oral Daily   heparin (porcine) 5,000 Units Subcutaneous Q8H   nicotine 1 patch Transdermal Q24H   nystatin-triamcinolone  Topical Q12H   pharmacy consult - MTM  Does not apply Daily   polyethylene glycol 17 g Oral Daily   predniSONE 40 mg Oral Daily With Breakfast   propranolol 10 mg Oral BID   sodium chloride 10 mL Intravenous Q12H     Continuous  Infusions:   PRN Meds:.•  acetaminophen **OR** acetaminophen **OR** acetaminophen  •  arformoterol  •  bisacodyl  •  budesonide  •  docusate sodium  •  famotidine  •  ipratropium-albuterol  •  labetalol  •  melatonin  •  ondansetron **OR** ondansetron  •  simethicone  •  sodium chloride    Assessment/Plan   Assessment / Plan     Active Hospital Problems    Diagnosis  POA   • **Acute respiratory failure with hypoxia (CMS/HCC) [J96.01]  Yes   • Tobacco use [Z72.0]  Yes   • COPD with acute exacerbation (CMS/HCC) [J44.1]  Yes   • Influenza [J11.1]  Yes   • Benign essential tremor [G25.0]  Yes   • Unintentional weight loss [R63.4]  Yes   • Essential hypertension [I10]  Yes   • Hypertensive urgency [I16.0]  Yes   • Debility [R53.81]  Yes      Resolved Hospital Problems   No resolved problems to display.          Brief Hospital Course to date:  Sheila Gipson is a 69 y.o. female with PMH significant for HTN, COPD, tobacco use who was admitted on 2/9 for acute on chronic hypoxic respiratory failure on 3L nasal canula at home, now back at baseline respiratory status.     Acute on chronic  hypoxic respiratory failure   COPD  CT PE negative for PE  -Continue Brovana, Pulmicort and duo nebs while inpatient   -continue prednisone 40 mg po - will need taper on discharge   -completed ceftriaxone, azithromycin, and tamiflu treatment      Sinus bradycardia with sinus arrhythmia - stable   -EKG unchanged from previous in 2010   -Echo with EF wnl no diastolic dysfunction mentioned      HTN  - stable, /72 after morning medications  - continue propanolol  - continue amlodipine 10 mg qd     Left foot rash  -suspect fungal origin  -Nystatin/triamcinolone cream BID    DVT Prophylaxis:  Heparin SQ    CODE STATUS:   Code Status and Medical Interventions:   Ordered at: 02/09/20 1526     Code Status:    No CPR     Medical Interventions (Level of Support Prior to Arrest):    Full     Comments:    ok with intubation no CPR            Electronically signed by GEOVANNA Abebe, 02/16/20, 1:46 PM.

## 2020-02-16 NOTE — PLAN OF CARE
Problem: Patient Care Overview  Goal: Plan of Care Review  Outcome: Ongoing (interventions implemented as appropriate)  Flowsheets (Taken 2/16/2020 1431)  Outcome Summary: Pt [facility]IC; issued AE for LBD and pt engaged in ADL retraining with Min A required; engaged in BU/LE strengthening ex's, 10 reps all ex's with education on sequencing adaptive breathing with exertion. Progressing toward fxl goals. Continue per OT POC.

## 2020-02-16 NOTE — THERAPY TREATMENT NOTE
Acute Care - Occupational Therapy Treatment Note  [facility]     Patient Name: Sheila Gipson  : 1950  MRN: [id number]  Today's Date: 2020     Date of Referral to OT: 20       Admit Date: 2020       ICD-10-CM ICD-9-CM   1. Impaired mobility and ADLs Z74.09 799.89     Patient Active Problem List   Diagnosis   • Acute respiratory failure with hypoxia (CMS/HCC)   • Tobacco use   • COPD with acute exacerbation (CMS/HCC)   • Influenza   • Benign essential tremor   • Unintentional weight loss   • Essential hypertension   • Hypertensive urgency   • Debility     Past Medical History:   Diagnosis Date   • COPD (chronic obstructive pulmonary disease) (CMS/HCC)    • Hypertension      History reviewed. No pertinent surgical history.    Therapy Treatment    Rehabilitation Treatment Summary     Row Name 20 1431             Treatment Time/Intention    Discipline  occupational therapist  -TA      Document Type  therapy note (daily note)  -TA      Subjective Information  complains of;weakness  -TA      Mode of Treatment  occupational therapy  -TA      Patient/Family Observations  No family present; Pt [facility]IC  -TA      Care Plan Review  care plan/treatment goals reviewed;risks/benefits reviewed;patient/other agree to care plan  -TA      Total Minutes, Occupational Therapy Treatment  24  -TA      Patient Effort  good  -TA      Existing Precautions/Restrictions  fall;oxygen therapy device and L/min  -TA      Equipment Issued to Patient  dressing equipment  -TA      Recorded by [TA] Lawrence Londono, OT 20 1507      Row Name 20 1431             Vital Signs    Pre Systolic BP Rehab  -- VSS; RN cleared pt for tx  -TA      Pre SpO2 (%)  96  -TA      O2 Delivery Pre Treatment  supplemental O2  -TA      Intra SpO2 (%)  85  -TA      O2 Delivery Intra Treatment  supplemental O2  -TA      Post SpO2 (%)  93  -TA      O2 Delivery Post Treatment  supplemental O2  -TA      Pre Patient Position  Sitting   -TA      Intra Patient Position  Sitting  -TA      Post Patient Position  Sitting  -TA      Recovery Time  1-2 mins with VCs for adaptive breathing  -TA      Recorded by [TA] Lawrnece Londono, OT 02/16/20 1507      Row Name 02/16/20 1431             Cognitive Assessment/Intervention- PT/OT    Affect/Mental Status (Cognitive)  WFL  -TA      Orientation Status (Cognition)  oriented x 4  -TA      Follows Commands (Cognition)  WFL  -TA      Safety Deficit (Cognitive)  mild deficit;awareness of need for assistance;safety precautions awareness;safety precautions follow-through/compliance  -TA      Recorded by [TA] Lawrence Londono, OT 02/16/20 1507      Row Name 02/16/20 1431             Safety Issues, Functional Mobility    Safety Issues Affecting Function (Mobility)  awareness of need for assistance;safety precaution awareness;safety precautions follow-through/compliance  -TA      Impairments Affecting Function (Mobility)  balance;endurance/activity tolerance;shortness of breath;strength  -TA      Recorded by [TA] Lawrence Londono OT 02/16/20 1507      Row Name 02/16/20 1431             Bed Mobility Assessment/Treatment    Comment (Bed Mobility)  Pt UIC  -TA      Recorded by [TA] Lawrence Londono, OT 02/16/20 1507      Row Name 02/16/20 1431             ADL Assessment/Intervention    81635 - OT Self Care/Mgmt Minutes  11  -TA      BADL Assessment/Intervention  lower body dressing  -TA      Recorded by [TA] Lawrence Londono, OT 02/16/20 1507      Row Name 02/16/20 1431             Lower Body Dressing Assessment/Training    Lower Body Dressing [place]  don;doff;socks;minimum assist (75% patient effort);verbal cues  -TA      Assistive Devices (Lower Body Dressing)  reacher;sock-aid  -TA      Lower Body Dressing Position  supported sitting  -TA      Comment (Lower Body Dressing)  AE issued for LBD, ADL training  -TA      Recorded by [TA] Lawrence Londono OT 02/16/20 1507      Row Name 02/16/20  1431             BADL Safety/Performance    Impairments, BADL Safety/Performance  balance;endurance/activity tolerance;shortness of breath;strength  -TA      Skilled BADL Treatment/Intervention  adaptive equipment training;BADL process/adaptation training  -TA      Progress in BADL Status  improvement noted  -TA      Recorded by [TA] Lawrence Londono, OT 02/16/20 1507      Row Name 02/16/20 1431             Motor Skills Assessment/Interventions    Additional Documentation  Therapeutic Exercise (Group);Therapeutic Exercise Interventions (Group)  -TA      Recorded by [TA] Lawrence Londono, OT 02/16/20 1507      Row Name 02/16/20 1431             Therapeutic Exercise    Therapeutic Exercise  seated, upper extremities;seated, lower extremities  -TA      Additional Documentation  Therapeutic Exercise (Row)  -TA      14422 - OT Therapeutic Exercise Minutes  13  -TA      Recorded by [TA] Lawrence Londono, OT 02/16/20 1507      Row Name 02/16/20 1431             Upper Extremity Seated Therapeutic Exercise    Performed, Seated Upper Extremity (Therapeutic Exercise)  shoulder flexion/extension;shoulder horizontal abduction/adduction;scapular stabilization;elbow flexion/extension;digit flexion/extension  -TA      Exercise Type, Seated Upper Extremity (Therapeutic Exercise)  AROM (active range of motion) sequenced with adaptive breathing  -TA      Sets/Reps Detail, Seated Upper Extremity (Therapeutic Exercise)  1/10  -TA      Recorded by [TA] Lawrence Londono, OT 02/16/20 1507      Row Name 02/16/20 1431             Lower Extremity Seated Therapeutic Exercise    Performed, Seated Lower Extremity (Therapeutic Exercise)  hip flexion/extension;knee flexion/extension;ankle dorsiflexion/plantarflexion;other (see comments) glut set  -TA      Exercise Type, Seated Lower Extremity (Therapeutic Exercise)  AROM (active range of motion)  -TA      Sets/Reps Detail, Seated Lower Extremity (Therapeutic Exercise)  1/10  -TA       Recorded by [TA] Lawrence Londono, OT 02/16/20 1507      Row Name 02/16/20 1431             Static Sitting Balance    Level of [place] (Unsupported Sitting, Static Balance)  independent  -TA      Sitting Position (Unsupported Sitting, Static Balance)  sitting in chair  -TA      Time Able to Maintain Position (Unsupported Sitting, Static Balance)  more than 5 minutes  -TA      Recorded by [TA] Lawrence Londono, OT 02/16/20 1507      Row Name 02/16/20 1431             Pain Assessment    Additional Documentation  Pain Scale: Numbers Pre/Post-Treatment (Group)  -TA      Recorded by [TA] Lawrence Londono, OT 02/16/20 1507      Row Name 02/16/20 1431             Pain Scale: Numbers Pre/Post-Treatment    Pain Scale: Numbers, Pretreatment  0/10 - no pain  -TA      Pain Scale: Numbers, Post-Treatment  0/10 - no pain  -TA      Pre/Post Treatment Pain Comment  Pt denied pain, tolerated  -TA      Pain Intervention(s)  Ambulation/increased activity  -TA      Recorded by [TA] Lawrence Londono, OT 02/16/20 1507      Row Name                Wound 02/09/20 1500 nose Pressure Injury    Wound - Properties Group Date first assessed: 02/09/20 [CS] Time first assessed: 1500 [CS] Present on Hospital Admission: Y [CS] Location: nose [CS] Primary Wound Type: Pressure inj [CS] Stage, Pressure Injury: Stage 1 [CS] Additional Comments: healing pressure wound from use of bipap mask [CS] Recorded by:  [CS] Gerri Mckeon RN 02/09/20 2040    Row Name 02/16/20 1431             Coping    Observed Emotional State  calm;cooperative  -TA      Verbalized Emotional State  acceptance  -TA      Recorded by [TA] Lawrence Londono, OT 02/16/20 1507      Row Name 02/16/20 1431             Plan of Care Review    Plan of Care Reviewed With  patient  -TA      Recorded by [TA] Lawrence Londono, OT 02/16/20 1507      Row Name 02/16/20 1431             Outcome Summary/Treatment Plan (OT)    Daily Summary of Progress (OT)  progress  toward functional goals as expected  -TA      Plan for Continued Treatment (OT)  Continue per OT POC  -TA      Anticipated Discharge Disposition (OT)  inpatient rehabilitation facility  -TA      Recorded by [TA] Lawrence Londono, OT 02/16/20 9621        User Key  (r) = Recorded By, (t) = Taken By, (c) = Cosigned By    Initials Name Effective Dates Discipline    TA Lawrence Londono, OT 03/14/16 -  OT    CS Gerri Mckeon RN 07/10/18 -  Nurse        Wound 02/09/20 1500 nose Pressure Injury (Active)   Dressing Appearance open to air 2/16/2020  2:00 PM   Base scab 2/16/2020  2:00 PM   Periwound dry;intact 2/16/2020  8:58 AM   Periwound Temperature warm 2/15/2020  9:30 PM   Periwound Skin Turgor soft 2/15/2020  9:30 PM   Drainage Amount none 2/15/2020  9:30 PM   Dressing Care, Wound open to air 2/15/2020  9:30 PM   Periwound Care, Wound dry periwound area maintained 2/15/2020  9:30 PM           OT Recommendation and Plan  Outcome Summary/Treatment Plan (OT)  Daily Summary of Progress (OT): progress toward functional goals as expected  Plan for Continued Treatment (OT): Continue per OT POC  Anticipated Discharge Disposition (OT): inpatient rehabilitation facility  Daily Summary of Progress (OT): progress toward functional goals as expected  Plan of Care Review  Plan of Care Reviewed With: patient  Plan of Care Reviewed With: patient  Outcome Summary: Pt [facility]; issued AE for LBD and pt engaged in ADL retraining with Min A required; engaged in BU/LE strengthening ex's, 10 reps all ex's with education on sequencing adaptive breathing with exertion. Progressing toward fxl goals. Continue per OT POC.  Outcome Measures     Row Name 02/16/20 7996             How much help from another is currently needed...    Putting on and taking off regular lower body clothing?  3  -TA      Bathing (including washing, rinsing, and drying)  2  -TA      Toileting (which includes using toilet bed pan or urinal)  2  -TA      Putting on  and taking off regular upper body clothing  3  -TA      Taking care of personal grooming (such as brushing teeth)  3  -TA      Eating meals  3  -TA      AM-PAC 6 Clicks Score (OT)  16  -TA         Functional Assessment    Outcome Measure Options  AM-PAC 6 Clicks Daily Activity (OT)  -TA        User Key  (r) = Recorded By, (t) = Taken By, (c) = Cosigned By    Initials Name Provider Type    Lawrence Ashraf OT Occupational Therapist           Time Calculation:   Time Calculation- OT     Row Name 02/16/20 1512 02/16/20 1431          Time Calculation- OT    OT Start Time  1431 ttc 24 minutes  -TA  --     Total Timed Code Minutes- OT  24 minute(s)  -TA  --     OT Received On  02/16/20  -TA  --     OT Goal Re-Cert Due Date  02/21/20  -TA  --        Timed Charges    15693 - OT Therapeutic Exercise Minutes  --  13  -TA     58588 - OT Self Care/Mgmt Minutes  --  11  -TA       User Key  (r) = Recorded By, (t) = Taken By, (c) = Cosigned By    Initials Name Provider Type    Lawrence Ashraf OT Occupational Therapist        Therapy Charges for Today     Code Description Service Date Service Provider Modifiers Qty    14151680174  OT THER PROC EA 15 MIN 2/16/2020 Lawrence Londono OT GO 1    [id number] HC OT SELF CARE/MGMT/TRAIN EA 15 MIN 2/16/2020 Lawrence Londono OT GO 1               Lawrence Londono OT  2/16/2020

## 2020-02-16 NOTE — PLAN OF CARE
Pt VSS on 3L NC (baseline). Awaiting pre-cert for transfer to [facility] in [place]. Will continue to monitor.

## 2020-02-17 VITALS
TEMPERATURE: 97.3 F | BODY MASS INDEX: 18.64 KG/M2 | WEIGHT: 116 LBS | SYSTOLIC BLOOD PRESSURE: 94 MMHG | DIASTOLIC BLOOD PRESSURE: 63 MMHG | RESPIRATION RATE: 18 BRPM | HEART RATE: 83 BPM | OXYGEN SATURATION: 93 % | HEIGHT: 66 IN

## 2020-02-17 PROBLEM — J11.1 INFLUENZA: Status: RESOLVED | Noted: 2020-02-09 | Resolved: 2020-02-17

## 2020-02-17 PROBLEM — J96.01 ACUTE RESPIRATORY FAILURE WITH HYPOXIA (HCC): Status: RESOLVED | Noted: 2020-02-09 | Resolved: 2020-02-17

## 2020-02-17 PROBLEM — I16.0 HYPERTENSIVE URGENCY: Status: RESOLVED | Noted: 2020-02-09 | Resolved: 2020-02-17

## 2020-02-17 PROCEDURE — 94799 UNLISTED PULMONARY SVC/PX: CPT

## 2020-02-17 PROCEDURE — 97110 THERAPEUTIC EXERCISES: CPT

## 2020-02-17 PROCEDURE — 99239 HOSP IP/OBS DSCHRG MGMT >30: CPT | Performed by: FAMILY MEDICINE

## 2020-02-17 PROCEDURE — 25010000002 HEPARIN (PORCINE) PER 1000 UNITS: Performed by: INTERNAL MEDICINE

## 2020-02-17 PROCEDURE — 63710000001 PREDNISONE PER 1 MG: Performed by: INTERNAL MEDICINE

## 2020-02-17 RX ORDER — AMLODIPINE BESYLATE 10 MG/1
10 TABLET ORAL
Qty: 30 TABLET | Refills: 0 | Status: SHIPPED | OUTPATIENT
Start: 2020-02-17 | End: 2020-03-18

## 2020-02-17 RX ORDER — BUDESONIDE 0.5 MG/2ML
0.5 INHALANT ORAL 2 TIMES DAILY PRN
Qty: 2 ML | Refills: 0 | Status: SHIPPED | OUTPATIENT
Start: 2020-02-17

## 2020-02-17 RX ORDER — PREDNISONE 20 MG/1
TABLET ORAL
Qty: 14 TABLET | Refills: 0 | Status: SHIPPED | OUTPATIENT
Start: 2020-02-17 | End: 2020-02-29

## 2020-02-17 RX ORDER — ALBUTEROL SULFATE 90 UG/1
2 AEROSOL, METERED RESPIRATORY (INHALATION) EVERY 4 HOURS PRN
Qty: 8 G | Refills: 2 | Status: SHIPPED | OUTPATIENT
Start: 2020-02-17 | End: 2020-03-18

## 2020-02-17 RX ORDER — CETIRIZINE HYDROCHLORIDE 10 MG/1
10 TABLET ORAL DAILY
Qty: 30 TABLET | Refills: 0 | Status: SHIPPED | OUTPATIENT
Start: 2020-02-17 | End: 2020-03-18

## 2020-02-17 RX ORDER — FAMOTIDINE 20 MG/1
20 TABLET, FILM COATED ORAL 2 TIMES DAILY PRN
Qty: 14 TABLET | Refills: 0 | Status: SHIPPED | OUTPATIENT
Start: 2020-02-17 | End: 2020-03-02

## 2020-02-17 RX ADMIN — NICOTINE 1 PATCH: 14 PATCH, EXTENDED RELEASE TRANSDERMAL at 08:32

## 2020-02-17 RX ADMIN — HEPARIN SODIUM 5000 UNITS: 5000 INJECTION, SOLUTION INTRAVENOUS; SUBCUTANEOUS at 05:18

## 2020-02-17 RX ADMIN — IPRATROPIUM BROMIDE AND ALBUTEROL SULFATE 3 ML: 2.5; .5 SOLUTION RESPIRATORY (INHALATION) at 11:01

## 2020-02-17 RX ADMIN — PREDNISONE 40 MG: 20 TABLET ORAL at 08:31

## 2020-02-17 RX ADMIN — PROPRANOLOL HYDROCHLORIDE 10 MG: 10 TABLET ORAL at 08:31

## 2020-02-17 RX ADMIN — DOCUSATE SODIUM 100 MG: 100 CAPSULE, LIQUID FILLED ORAL at 05:18

## 2020-02-17 RX ADMIN — AMLODIPINE BESYLATE 10 MG: 10 TABLET ORAL at 08:32

## 2020-02-17 RX ADMIN — CETIRIZINE HYDROCHLORIDE 10 MG: 10 TABLET, FILM COATED ORAL at 08:32

## 2020-02-17 RX ADMIN — IPRATROPIUM BROMIDE AND ALBUTEROL SULFATE 3 ML: 2.5; .5 SOLUTION RESPIRATORY (INHALATION) at 05:42

## 2020-02-17 RX ADMIN — SODIUM CHLORIDE, PRESERVATIVE FREE 10 ML: 5 INJECTION INTRAVENOUS at 08:32

## 2020-02-17 RX ADMIN — NYSTATIN AND TRIAMCINOLONE ACETONIDE: 100000; 1 CREAM TOPICAL at 08:33

## 2020-02-17 NOTE — CONSULTS
NN spoke with pt at .  Pt up in chair eating breakfast.  She is on 2L NC, no outward signs of distress noted.  Pt states that she hopes to go to rehab today, she reports no cravings for nicotine. NN attempted to replace pulse ox but pt refused while eating, primary RN aware.  No other questions or concerns voiced at this time.  NN will continue to follow.

## 2020-02-17 NOTE — PLAN OF CARE
Problem: Patient Care Overview  Goal: Plan of Care Review  Outcome: Ongoing (interventions implemented as appropriate)  Flowsheets (Taken 2/17/2020 2856)  Progress: no change  Plan of Care Reviewed With: patient  Outcome Summary: Pt declined to perform transfers or ambulate today secondary to pain in LLE, full treatment done in recliner.  Pt on baseline 3L O2 NC throughout session. VS monitored throughout session today. BP dropped midway through session, but had stabilized by the end of session.  Required restbreaks between all ther ex due to fatigue.  Recommend SNF for DC.

## 2020-02-17 NOTE — THERAPY TREATMENT NOTE
Patient Name: Sheila Gipson  : 1950    MRN: [id number]                              Today's Date: 2020       Admit Date: 2020    Visit Dx:     ICD-10-CM ICD-9-CM   1. Impaired mobility and ADLs Z74.09 799.89     Patient Active Problem List   Diagnosis   • Acute respiratory failure with hypoxia (CMS/HCC)   • Tobacco use   • COPD with acute exacerbation (CMS/[facility])   • Influenza   • Benign essential tremor   • Unintentional weight loss   • Essential hypertension   • Hypertensive urgency   • Debility     Past Medical History:   Diagnosis Date   • COPD (chronic obstructive pulmonary disease) (CMS/[facility])    • Hypertension      History reviewed. No pertinent surgical history.  General Information     Row Name 20 0950          PT Evaluation Time/Intention    Document Type  therapy note (daily note)  (Pended)   -     Mode of Treatment  physical therapy;individual therapy  (Pended)   -     Row Name 20 0950          General Information    Patient Profile Reviewed?  yes  (Pended)   -     Existing Precautions/Restrictions  fall;oxygen therapy device and L/min  (Pended)   -     Row Name 20 0950          Cognitive Assessment/Intervention- PT/OT    Orientation Status (Cognition)  oriented x 4  (Pended)   -     Row Name 20 0950          Safety Issues, Functional Mobility    Impairments Affecting Function (Mobility)  balance;endurance/activity tolerance;shortness of breath;strength  (Pended)   -       User Key  (r) = Recorded By, (t) = Taken By, (c) = Cosigned By    Initials Name Provider Type     Valeria Dunlap PT Student PT Student        Mobility     Row Name 20 0950          Transfer Assessment/Treatment    Comment (Transfers)  Patient declined to perform transfers today due to L foot pain and fatigue.    (Pended)   -     Row Name 20 0950          Gait/Stairs Assessment/Training    Comment (Gait/Stairs)  Patient declined to perform transfers today due to L  foot pain and fatigue.    (Pended)   -KM       User Key  (r) = Recorded By, (t) = Taken By, (c) = Cosigned By    Initials Name Provider Type    Valeria Bagley PT Student PT Student        Obj/Interventions     Row Name 02/17/20 0950          Therapeutic Exercise    Lower Extremity (Therapeutic Exercise)  LAQ (long arc quad), bilateral;quad sets, bilateral;SLR (straight leg raise), bilateral  (Pended)   -KM     Lower Extremity Range of Motion (Therapeutic Exercise)  ankle dorsiflexion/plantar flexion, bilateral;hip abduction/adduction, bilateral  (Pended)   -KM     Exercise Type (Therapeutic Exercise)  AROM (active range of motion)  (Pended)   -KM     Position (Therapeutic Exercise)  seated  (Pended)   -KM     Sets/Reps (Therapeutic Exercise)  1 x 15 reps  (Pended)   -KM     Expected Outcome (Therapeutic Exercise)  facilitate normal movement patterns;improve functional tolerance, household activity  (Pended)   -KM     Row Name 02/17/20 0950          Static Sitting Balance    Level of [place] (Unsupported Sitting, Static Balance)  conditional independence  (Pended)   -KM     Sitting Position (Unsupported Sitting, Static Balance)  sitting in chair  (Pended)   -KM     Time Able to Maintain Position (Unsupported Sitting, Static Balance)  more than 5 minutes  (Pended)   -KM       User Key  (r) = Recorded By, (t) = Taken By, (c) = Cosigned By    Initials Name Provider Type    Valeria Bagley PT Student PT Student        Goals/Plan     Row Name 02/17/20 0950          Bed Mobility Goal 1 (PT)    Progress/Outcomes (Bed Mobility Goal 1, PT)  goal ongoing  (Pended)   -KM     Row Name 02/17/20 0950          Transfer Goal 1 (PT)    Progress/Outcome (Transfer Goal 1, PT)  goal ongoing  (Pended)   -KM     Row Name 02/17/20 0950          Gait Training Goal 1 (PT)    Progress/Outcome (Gait Training Goal 1, PT)  goal ongoing  (Pended)   -KM     Row Name 02/17/20 0950          Stairs Goal 1 (PT)    Progress/Outcome  (Stairs Goal 1, PT)  goal ongoing  (Pended)   -KM       User Key  (r) = Recorded By, (t) = Taken By, (c) = Cosigned By    Initials Name Provider Type    Valeria Bagley, PT Student PT Student        Clinical Impression     Row Name 02/17/20 0950          Pain Assessment    Additional Documentation  Pain Scale: FACES Pre/Post-Treatment (Group)  (Pended)   -     Row Name 02/17/20 0950          Pain Scale: Numbers Pre/Post-Treatment    Pain Scale: Numbers, Pretreatment  3/10  (Pended)   -KM     Pain Scale: Numbers, Post-Treatment  3/10  (Pended)   -KM     Pain Location - Side  Left  (Pended)   -KM     Pain Location - Orientation  generalized  (Pended)   -KM     Pain Location  foot  (Pended)   -KM     Pain Intervention(s)  Ambulation/increased activity;Repositioned  (Pended)   -     Row Name 02/17/20 0950          Plan of Care Review    Progress  no change  (Pended)   -     Row Name 02/17/20 0950          Vital Signs    Pre Systolic BP Rehab  133  (Pended)   -KM     Pre Treatment Diastolic BP  69  (Pended)   -KM     Intra Systolic BP Rehab  94  (Pended)   -KM     Intra Treatment Diastolic BP  63  (Pended)   -KM     Post Systolic BP Rehab  102  (Pended)   -KM     Post Treatment Diastolic BP  78  (Pended)   -KM     Pretreatment Heart Rate (beats/min)  86  (Pended)   -KM     Posttreatment Heart Rate (beats/min)  90  (Pended)   -KM     Pre SpO2 (%)  98  (Pended)   -KM     O2 Delivery Pre Treatment  supplemental O2  (Pended)   -KM     Post SpO2 (%)  96  (Pended)   -KM     O2 Delivery Post Treatment  supplemental O2  (Pended)   -     Row Name 02/17/20 0950          Positioning and Restraints    Pre-Treatment Position  sitting in chair/recliner  (Pended)   -KM     Post Treatment Position  chair  (Pended)   -KM     In Chair  call light within reach;encouraged to call for assist;exit alarm on;notified nsg;sitting  (Pended)   -KM       User Key  (r) = Recorded By, (t) = Taken By, (c) = Cosigned By    Initials Name  Provider Type    Valeria Bagley, PT Student PT Student        Outcome Measures     Row Name 02/17/20 0950          How much help from another person do you currently need...    Turning from your back to your side while in flat bed without using bedrails?  4  (Pended)   -KM     Moving from lying on back to sitting on the side of a flat bed without bedrails?  3  (Pended)   -KM     Moving to and from a bed to a chair (including a wheelchair)?  3  (Pended)   -KM     Standing up from a chair using your arms (e.g., wheelchair, bedside chair)?  3  (Pended)   -KM     Climbing 3-5 steps with a railing?  2  (Pended)   -KM     To walk in hospital room?  3  (Pended)   -KM     AM-PAC 6 Clicks Score (PT)  18  (Pended)   -KM     Row Name 02/17/20 0950          Functional Assessment    Outcome Measure Options  AM-PAC 6 Clicks Basic Mobility (PT)  (Pended)   -KM       User Key  (r) = Recorded By, (t) = Taken By, (c) = Cosigned By    Initials Name Provider Type    Valeria Bagley, PT Student PT Student          PT Recommendation and Plan     Outcome Summary/Treatment Plan (PT)  Anticipated Discharge Disposition (PT): (P) skilled nursing facility  Plan of Care Reviewed With: (P) patient  Progress: (P) no change  Outcome Summary: (P) Pt declined to perform transfers or ambulate today secondary to pain in LLE.  VS monitored throughout session today. BP dropped midway through session, stabilized by the end of session.  Required restbreaks between all ther ex.  Recommend SNF for DC.     Time Calculation:   PT Charges     Row Name 02/17/20 0950             Time Calculation    Start Time  0950  (Pended)   -KM      PT Received On  02/17/20  (Pended)   -KM      PT Goal Re-Cert Due Date  02/25/20  (Pended)   -KM         Time Calculation- PT    Total Timed Code Minutes- PT  27 minute(s)  (Pended)   -KM         Timed Charges    43644 - PT Therapeutic Exercise Minutes  27  (Pended)   -KM        User Key  (r) = Recorded By, (t) = Taken  By, (c) = Cosigned By    Initials Name Provider Type     Valeria Dunlap, PT Student PT Student        Therapy Charges for Today     Code Description Service Date Service Provider Modifiers Qty    70701018004 HC PT THER PROC EA 15 MIN 2/17/2020 Valeria Dunlap, PT Student GP 2          PT G-Codes  Outcome Measure Options: (P) AM-PAC 6 Clicks Basic Mobility (PT)  AM-PAC 6 Clicks Score (PT): (P) 18  AM-PAC 6 Clicks Score (OT): 16    Valeria Dunlap, PT Student  2/17/2020

## 2020-02-17 NOTE — PROGRESS NOTES
Case Management Discharge Note      Final Note: Insurance has approved Mrs. Gipson's transfer to a skilled bed today. Her daughter Marta is going to transport her there this afternoon by car. A portable oxygen tank was delivered by [facility] for the ride there. Nurse to call report to  [phone].         Destination - Selection Complete      Service Provider Request Status Selected Services Address Phone Number Fax Number    [facility] Selected Skilled Nursing [address] [facility] [address] [phone]      Durable Medical Equipment      No service has been selected for the patient.      Dialysis/Infusion      No service has been selected for the patient.      Home Medical Care      No service has been selected for the patient.      Therapy      No service has been selected for the patient.      Community Resources      No service has been selected for the patient.             Final Discharge Disposition Code: 03 - skilled nursing facility (SNF)

## 2020-02-17 NOTE — DISCHARGE PLACEMENT REQUEST
"Sheila Harrington (69 y.o. Female)     Date of Birth Social Security Number Address Home Phone MRN    1950  [facility] [address] [address] [phone] [id number]    [employer] Mar[employer] Status          None        Admission Date Admission Type Admitting Provider Attending Provider Department, Room/Bed    20 Urgent Yelena Phoenix DO Abbeyquaye, Sarah Kathleen, DO [address], S474/1    Discharge Date Discharge Disposition Discharge Destination         Rehab Facility or Unit (DC - External)              Attending Provider:  Yelena Phoenix DO    Allergies:  No Known Allergies    Isolation:  None   Infection:  None   Code Status:  No CPR    Ht:  167.6 cm (66\")   Wt:  52.6 kg (116 lb)    Admission Cmt:  None   Principal Problem:  Acute respiratory failure with hypoxia (CMS/[facility]) [J96.01]                 Active Insurance as of 2020     Primary Coverage     Payor Plan Insurance Group Employer/Plan Group    HUMANA MEDICARE REPLACEMENT HUMANA MEDICARE REPLACEMENT [id number]     Payor Plan Address Payor Plan Phone Number Payor Plan Fax Number Effective Dates    [address] [address] [phone]  2020 - None Entered    [facility] [address]       Subscriber Name Subscriber Birth Date Member ID       SHEILA HARRINGTON 1950 [id number]                 Emergency Contacts      (Rel.) Home Phone Work Phone Mobile Phone    Cande Patton (Daughter) -- -- [phone]    Bekah Frey (Sister) -- -- [phone]    SanthoshAliyah (Daughter) -- -- [phone]    Israel Mccarthy (Daughter) -- -- [phone]               Discharge Summary      Yelena Phoenix DO at 20 1118              [facility] Medicine Services  DISCHARGE SUMMARY    Patient Name: Sheila Harrington  : 1950  MRN: [id number]    Date of Admission: 2020  2:29 PM  Date of Discharge:  2020  Primary Care Physician: Tu Lyons, " MD    Consults     Date and Time Order Name Status Description    2/10/2020 1019 Inpatient Cardiology Consult            Hospital Course     Presenting Problem:   Acute respiratory failure with hypoxia (CMS/HCC) [J96.01]    Active Hospital Problems    Diagnosis  POA   • Tobacco use [Z72.0]  Yes   • COPD with acute exacerbation (CMS/HCC) [J44.1]  Yes   • Benign essential tremor [G25.0]  Yes   • Unintentional weight loss [R63.4]  Yes   • Essential hypertension [I10]  Yes   • Debility [R53.81]  Yes      Resolved Hospital Problems    Diagnosis Date Resolved POA   • **Acute respiratory failure with hypoxia (CMS/HCC) [J96.01] 02/17/2020 Yes   • Influenza [J11.1] 02/17/2020 Yes   • Hypertensive urgency [I16.0] 02/17/2020 Yes          Hospital Course:  Sheila Gipson is a 69 y.o. female with past medical history of chronic respiratory failure, COPD, essential tremors, and hypertension who initially developed severe progressive shortness of breath on 2/4 associated with productive yellow sputum which was worsened with exertion and improved with rest and breathing treatments.  She had associated fevers and chills and flulike symptoms.  She presented to [facility] on 2/5 and was diagnosed with influenza and placed on BiPAP. She was transferred to [place] for a higher level of care. She arrived at our hospital on a BiPAP with 35% oxygen. During this admission she was weaned to 3L NC which she will be discharged on. She received, tamiflu for her influenza, steroids, azithromycin and rocephin for her influenza infection with COPD exacerbation. She quickly improved and will be discharged on a steroid taper. Her hypertension is well controlled with propranolol. The day prior to discharge she debeloped a rash on her left foot. She is to complete a 2 weeks course of antifungal with steroid cream.       Discharge Follow Up Recommendations for outpatient labs/diagnostics:   Follow up with PCP after discharge from Rehab    Day of  Discharge     HPI:   Patient is sitting up in chair. She states that her breathing is getting better. She is eager to be discharged. No acute events over night.     Review of Systems  Gen- No fevers, chills  CV- No chest pain, palpitations  Resp- No cough, dyspnea  GI- No N/V/D, abd pain        Vital Signs:   Temp:  [97.3 °F (36.3 °C)-98.2 °F (36.8 °C)] 97.3 °F (36.3 °C)  Heart Rate:  [] 81  Resp:  [16-20] 18  BP: (105-133)/(63-78) 133/69     Physical Exam:  Constitutional: No acute distress, awake, alert, elderly female   HENT: NCAT, mucous membranes moist  Respiratory: + expiratory wheezing bilaterally , respiratory effort normal   Cardiovascular: RRR, no murmurs, rubs, or gallops, palpable pedal pulses bilaterally  Gastrointestinal: Positive bowel sounds, soft, nontender, nondistended  Musculoskeletal: No bilateral ankle edema  Psychiatric: Appropriate affect, cooperative  Neurologic: Oriented x 3, strength symmetric in all extremities, Cranial Nerves grossly intact to confrontation, speech clear  Skin: No rashes, + erythema left foot       Pertinent  and/or Most Recent Results     Results from last 7 days   Lab Units 02/14/20  0643   WBC 10*3/mm3 11.33*   HEMOGLOBIN g/dL 14.1   HEMATOCRIT % 43.8   PLATELETS 10*3/mm3 177           Invalid input(s): PROT, LABALBU        Invalid input(s): TG, LDLCALC, LDLREALC        Brief Urine Lab Results     None          Microbiology Results Abnormal     Procedure Component Value - Date/Time    Blood Culture - Blood, Arm, Left [[id number]] Collected:  02/09/20 1533    Lab Status:  Final result Specimen:  Blood from Arm, Left Updated:  02/14/20 1630     Blood Culture No growth at 5 days    Blood Culture - Blood, Arm, Right [[id number]] Collected:  02/09/20 1542    Lab Status:  Final result Specimen:  Blood from Arm, Right Updated:  02/14/20 1630     Blood Culture No growth at 5 days          Imaging Results (All)     Procedure Component Value Units Date/Time    CT Angiogram  Chest With & Without Contrast [[id number]] Collected:  02/10/20 1452     Updated:  02/10/20 1838    Narrative:       EXAMINATION: CT ANGIOGRAM CHEST W WO CONTRAST-02/10/2020:      INDICATION: Rule out PE, chest pain, shortness of breath.     TECHNIQUE: Multiple axial CT imaging was obtained of the chest from the  thoracic inlet through the adrenal glands following the administration  of intravenous contrast. Coronal and sagittal reformatted images were  submitted to further facilitate diagnostic accuracy and treatment  planning.     The radiation dose reduction device was turned on for each scan per the  ALARA (As Low as Reasonably Achievable) protocol.     COMPARISON: NONE.     FINDINGS: The thyroid is homogeneous in appearance. No mediastinal mass  or adenopathy. The cardiac chambers are within normal limits. No  pericardial effusion. No bulky hilar or axillary lymphadenopathy. No  filling defect to suggest evidence of pulmonary embolism. There are  atelectatic changes identified in the lung bases bilaterally. There is  no definite pleural effusion or pneumothorax. Degenerative changes seen  within the spine. Minimal vascular calcification seen within the  thoracic aorta.       Impression:       No PE, no acute intrathoracic abnormality.     D:  02/10/2020  E:  02/10/2020           This report was finalized on 2/10/2020 6:35 PM by Dr. Sara Wong MD.       XR Chest 1 View [[id number]] Collected:  02/09/20 1725     Updated:  02/10/20 1834    Narrative:       EXAMINATION: XR CHEST 1 VW-      INDICATION: Fever.      COMPARISON: 12/27/2010.     FINDINGS: Portable chest reveals cardiac and mediastinal silhouettes  within normal limits. Severe emphysematous and bullous changes are seen  in the upper lung fields. The lung fields are otherwise clear. No focal  parenchymal opacification is present.  No pleural effusion or  pneumothorax. The bony structures reveal degenerative changes seen  within the spine. Vascular  calcification is seen within the thoracic  aorta.           Impression:       Severe emphysematous and chronic changes seen within the  lung fields with no evidence of acute parenchymal disease.     D:  02/09/2020  E:  02/10/2020     This report was finalized on 2/10/2020 6:31 PM by Dr. Sara Wong MD.       XR Chest 1 View [[id number]] Collected:  02/10/20 0505     Updated:  02/10/20 0507    Narrative:       CR Chest 1 Vw    INDICATION:   69-year-old female with shortness of air and COPD.     COMPARISON:    2/9/2020 at 1556 hours    FINDINGS:  Single portable AP view(s) of the chest.    No visible support lines.    Artifact related to support equipment on the right. Cardiac silhouette within normal limits. Unremarkable vascularity. Lungs demonstrate sequela of advanced COPD with pruning of the interstitial markings in the lung apices right greater than left with  associated bleb/bulla formation. There is no new dense consolidation or effusion and there is no pneumothorax. There are calcified granulomas.       Impression:         1. Chronic advanced emphysematous changes. No definite superimposed active disease.    Signer Name: Bradly Ross MD   Signed: 2/10/2020 5:05 AM   Workstation Name: [facility]    [facility]                    Results for orders placed during the hospital encounter of 02/09/20   Adult Transthoracic Echo Complete W/ Cont if Necessary Per Protocol    Narrative · Estimated EF = 55%.  · The cardiac valves are anatomically and functionally normal.          Plan for Follow-up of Pending Labs/Results:     Discharge Details        Discharge Medications      New Medications      Instructions Start Date   albuterol sulfate  (90 Base) MCG/ACT inhaler  Commonly known as:  PROVENTIL HFA;VENTOLIN HFA;PROAIR HFA   2 puffs, Inhalation, Every 4 Hours PRN      amLODIPine 10 MG tablet  Commonly known as:  NORVASC   10 mg, Oral, Every 24 Hours Scheduled      budesonide  0.5 MG/2ML nebulizer solution  Commonly known as:  PULMICORT   0.5 mg, Nebulization, 2 Times Daily PRN      cetirizine 10 MG tablet  Commonly known as:  zyrTEC   10 mg, Oral, Daily      famotidine 20 MG tablet  Commonly known as:  PEPCID   20 mg, Oral, 2 Times Daily PRN      nystatin-triamcinolone 575092-0.1 UNIT/GM-% cream  Commonly known as:  MYCOLOG II   Topical, Every 12 Hours Scheduled      polyethylene glycol pack packet  Commonly known as:  MIRALAX   17 g, Oral, Daily PRN      predniSONE 20 MG tablet  Commonly known as:  DELTASONE   Take 2 tablets by mouth Daily With Breakfast for 4 days, THEN 1 tablet Daily With Breakfast for 4 days, THEN 0.5 tablets Daily With Breakfast for 4 days.   Start Date:  February 17, 2020        Continue These Medications      Instructions Start Date   propranolol 10 MG tablet  Commonly known as:  INDERAL   10 mg, Oral, 2 Times Daily      TYLENOL SINUS NIGHT TIME PO   1 tablet, Oral, Nightly             No Known Allergies      Discharge Disposition:  Rehab Facility or Unit (DC - External)    Diet:  Hospital:  Diet Order   Procedures   • Diet Regular; Consistent Carbohydrate       Activity:  Activity Instructions     Activity as Tolerated            Restrictions or Other Recommendations:         CODE STATUS:    Code Status and Medical Interventions:   Ordered at: 02/09/20 1526     Code Status:    No CPR     Medical Interventions (Level of Support Prior to Arrest):    Full     Comments:    ok with intubation no CPR       No future appointments.    Additional Instructions for the Follow-ups that You Need to Schedule     Discharge Follow-up with PCP   As directed       Currently Documented PCP:    Tu Lyons MD    PCP Phone Number:    [phone]     Follow Up Details:  PCP after discharge from rehab               Time Spent on Discharge:  35 minutes    Electronically signed by Yelena Phoenix DO, 02/17/20, 11:18 AM.        Electronically signed by Alban  Yelena Monk DO at 02/17/20 1134

## 2020-02-17 NOTE — DISCHARGE SUMMARY
[facility] Medicine Services  DISCHARGE SUMMARY    Patient Name: Sheila Gipson  : 1950  MRN: [id number]    Date of Admission: 2020  2:29 PM  Date of Discharge:  2020  Primary Care Physician: Tu Lyons MD    Consults     Date and Time Order Name Status Description    2/10/2020 1019 Inpatient Cardiology Consult            Hospital Course     Presenting Problem:   Acute respiratory failure with hypoxia (CMS/HCC) [J96.01]    Active Hospital Problems    Diagnosis  POA   • Tobacco use [Z72.0]  Yes   • COPD with acute exacerbation (CMS/HCC) [J44.1]  Yes   • Benign essential tremor [G25.0]  Yes   • Unintentional weight loss [R63.4]  Yes   • Essential hypertension [I10]  Yes   • Debility [R53.81]  Yes      Resolved Hospital Problems    Diagnosis Date Resolved POA   • **Acute respiratory failure with hypoxia (CMS/HCC) [J96.01] 2020 Yes   • Influenza [J11.1] 2020 Yes   • Hypertensive urgency [I16.0] 2020 Yes          Hospital Course:  Sheila Gipson is a 69 y.o. female with past medical history of chronic respiratory failure, COPD, essential tremors, and hypertension who initially developed severe progressive shortness of breath on  associated with productive yellow sputum which was worsened with exertion and improved with rest and breathing treatments.  She had associated fevers and chills and flulike symptoms.  She presented to [facility] on  and was diagnosed with influenza and placed on BiPAP. She was transferred to [place] for a higher level of care. She arrived at our hospital on a BiPAP with 35% oxygen. During this admission she was weaned to 3L NC which she will be discharged on. She received, tamiflu for her influenza, steroids, azithromycin and rocephin for her influenza infection with COPD exacerbation. She quickly improved and will be discharged on a steroid taper. Her hypertension is well controlled with propranolol. The day  prior to discharge she debeloped a rash on her left foot. She is to complete a 2 weeks course of antifungal with steroid cream.       Discharge Follow Up Recommendations for outpatient labs/diagnostics:   Follow up with PCP after discharge from Rehab    Day of Discharge     HPI:   Patient is sitting up in chair. She states that her breathing is getting better. She is eager to be discharged. No acute events over night.     Review of Systems  Gen- No fevers, chills  CV- No chest pain, palpitations  Resp- No cough, dyspnea  GI- No N/V/D, abd pain        Vital Signs:   Temp:  [97.3 °F (36.3 °C)-98.2 °F (36.8 °C)] 97.3 °F (36.3 °C)  Heart Rate:  [] 81  Resp:  [16-20] 18  BP: (105-133)/(63-78) 133/69     Physical Exam:  Constitutional: No acute distress, awake, alert, elderly female   HENT: NCAT, mucous membranes moist  Respiratory: + expiratory wheezing bilaterally , respiratory effort normal   Cardiovascular: RRR, no murmurs, rubs, or gallops, palpable pedal pulses bilaterally  Gastrointestinal: Positive bowel sounds, soft, nontender, nondistended  Musculoskeletal: No bilateral ankle edema  Psychiatric: Appropriate affect, cooperative  Neurologic: Oriented x 3, strength symmetric in all extremities, Cranial Nerves grossly intact to confrontation, speech clear  Skin: No rashes, + erythema left foot       Pertinent  and/or Most Recent Results     Results from last 7 days   Lab Units 02/14/20  0643   WBC 10*3/mm3 11.33*   HEMOGLOBIN g/dL 14.1   HEMATOCRIT % 43.8   PLATELETS 10*3/mm3 177           Invalid input(s): PROT, LABALBU        Invalid input(s): TG, LDLCALC, LDLREALC        Brief Urine Lab Results     None          Microbiology Results Abnormal     Procedure Component Value - Date/Time    Blood Culture - Blood, Arm, Left [[id number]] Collected:  02/09/20 1533    Lab Status:  Final result Specimen:  Blood from Arm, Left Updated:  02/14/20 1630     Blood Culture No growth at 5 days    Blood Culture - Blood, Arm,  Right [[id number]] Collected:  02/09/20 1542    Lab Status:  Final result Specimen:  Blood from Arm, Right Updated:  02/14/20 1630     Blood Culture No growth at 5 days          Imaging Results (All)     Procedure Component Value Units Date/Time    CT Angiogram Chest With & Without Contrast [[id number]] Collected:  02/10/20 1452     Updated:  02/10/20 1838    Narrative:       EXAMINATION: CT ANGIOGRAM CHEST W WO CONTRAST-02/10/2020:      INDICATION: Rule out PE, chest pain, shortness of breath.     TECHNIQUE: Multiple axial CT imaging was obtained of the chest from the  thoracic inlet through the adrenal glands following the administration  of intravenous contrast. Coronal and sagittal reformatted images were  submitted to further facilitate diagnostic accuracy and treatment  planning.     The radiation dose reduction device was turned on for each scan per the  ALARA (As Low as Reasonably Achievable) protocol.     COMPARISON: NONE.     FINDINGS: The thyroid is homogeneous in appearance. No mediastinal mass  or adenopathy. The cardiac chambers are within normal limits. No  pericardial effusion. No bulky hilar or axillary lymphadenopathy. No  filling defect to suggest evidence of pulmonary embolism. There are  atelectatic changes identified in the lung bases bilaterally. There is  no definite pleural effusion or pneumothorax. Degenerative changes seen  within the spine. Minimal vascular calcification seen within the  thoracic aorta.       Impression:       No PE, no acute intrathoracic abnormality.     D:  02/10/2020  E:  02/10/2020           This report was finalized on 2/10/2020 6:35 PM by Dr. Sara Wong MD.       XR Chest 1 View [[id number]] Collected:  02/09/20 1725     Updated:  02/10/20 1834    Narrative:       EXAMINATION: XR CHEST 1 VW-      INDICATION: Fever.      COMPARISON: 12/27/2010.     FINDINGS: Portable chest reveals cardiac and mediastinal silhouettes  within normal limits. Severe emphysematous  and bullous changes are seen  in the upper lung fields. The lung fields are otherwise clear. No focal  parenchymal opacification is present.  No pleural effusion or  pneumothorax. The bony structures reveal degenerative changes seen  within the spine. Vascular calcification is seen within the thoracic  aorta.           Impression:       Severe emphysematous and chronic changes seen within the  lung fields with no evidence of acute parenchymal disease.     D:  02/09/2020  E:  02/10/2020     This report was finalized on 2/10/2020 6:31 PM by Dr. Sara Wong MD.       XR Chest 1 View [[id number]] Collected:  02/10/20 0505     Updated:  02/10/20 0507    Narrative:       CR Chest 1 Vw    INDICATION:   69-year-old female with shortness of air and COPD.     COMPARISON:    2/9/2020 at 1556 hours    FINDINGS:  Single portable AP view(s) of the chest.    No visible support lines.    Artifact related to support equipment on the right. Cardiac silhouette within normal limits. Unremarkable vascularity. Lungs demonstrate sequela of advanced COPD with pruning of the interstitial markings in the lung apices right greater than left with  associated bleb/bulla formation. There is no new dense consolidation or effusion and there is no pneumothorax. There are calcified granulomas.       Impression:         1. Chronic advanced emphysematous changes. No definite superimposed active disease.    Signer Name: Bradly Ross MD   Signed: 2/10/2020 5:05 AM   Workstation Name: [facility]    [facility]                    Results for orders placed during the hospital encounter of 02/09/20   Adult Transthoracic Echo Complete W/ Cont if Necessary Per Protocol    Narrative · Estimated EF = 55%.  · The cardiac valves are anatomically and functionally normal.          Plan for Follow-up of Pending Labs/Results:     Discharge Details        Discharge Medications      New Medications      Instructions Start Date    albuterol sulfate  (90 Base) MCG/ACT inhaler  Commonly known as:  PROVENTIL HFA;VENTOLIN HFA;PROAIR HFA   2 puffs, Inhalation, Every 4 Hours PRN      amLODIPine 10 MG tablet  Commonly known as:  NORVASC   10 mg, Oral, Every 24 Hours Scheduled      budesonide 0.5 MG/2ML nebulizer solution  Commonly known as:  PULMICORT   0.5 mg, Nebulization, 2 Times Daily PRN      cetirizine 10 MG tablet  Commonly known as:  zyrTEC   10 mg, Oral, Daily      famotidine 20 MG tablet  Commonly known as:  PEPCID   20 mg, Oral, 2 Times Daily PRN      nystatin-triamcinolone 089647-9.1 UNIT/GM-% cream  Commonly known as:  MYCOLOG II   Topical, Every 12 Hours Scheduled      polyethylene glycol pack packet  Commonly known as:  MIRALAX   17 g, Oral, Daily PRN      predniSONE 20 MG tablet  Commonly known as:  DELTASONE   Take 2 tablets by mouth Daily With Breakfast for 4 days, THEN 1 tablet Daily With Breakfast for 4 days, THEN 0.5 tablets Daily With Breakfast for 4 days.   Start Date:  February 17, 2020        Continue These Medications      Instructions Start Date   propranolol 10 MG tablet  Commonly known as:  INDERAL   10 mg, Oral, 2 Times Daily      TYLENOL SINUS NIGHT TIME PO   1 tablet, Oral, Nightly             No Known Allergies      Discharge Disposition:  Rehab Facility or Unit (DC - External)    Diet:  Hospital:  Diet Order   Procedures   • Diet Regular; Consistent Carbohydrate       Activity:  Activity Instructions     Activity as Tolerated            Restrictions or Other Recommendations:         CODE STATUS:    Code Status and Medical Interventions:   Ordered at: 02/09/20 1526     Code Status:    No CPR     Medical Interventions (Level of Support Prior to Arrest):    Full     Comments:    ok with intubation no CPR       No future appointments.    Additional Instructions for the Follow-ups that You Need to Schedule     Discharge Follow-up with PCP   As directed       Currently Documented PCP:    Tu Lyons,  MD    PCP Phone Number:    [phone]     Follow Up Details:  PCP after discharge from rehab               Time Spent on Discharge:  35 minutes    Electronically signed by Yelena Phoenix DO, 02/17/20, 11:18 AM.